# Patient Record
Sex: FEMALE | Race: WHITE | Employment: FULL TIME | URBAN - METROPOLITAN AREA
[De-identification: names, ages, dates, MRNs, and addresses within clinical notes are randomized per-mention and may not be internally consistent; named-entity substitution may affect disease eponyms.]

---
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
## 2019-01-16 NOTE — PROGRESS NOTES
Breast Surgery New Patient Consultation    This is the first visit for this 22year old woman, referred by Dr. Chuy Quiñonez, who presents for evaluation of bilateral breast lumps, breast pain and family history of breast cancer.     History of Present Illness: Comment:Shut down central nervous system.     Family History:   Family History   Problem Relation Age of Onset   • Cancer Mother 43        Breast cancer DCIS   • Thyroid Disorder Mother 52        Hashimoto's   • Diabetes Paternal Grandfather         Type 2 nausea, change in bowel habits, diarrhea, abdominal pain or vomiting blood.      Genitourinary:  The patient denies frequent urination, needing to get up at night to urinate, urinary hesitancy or retaining urine, painful urination, urinary incontinence, dec expands symmetrically. The lungs are clear to auscultation. Heart: The rhythm is regular. There are no murmurs, rubs, gallops or thrills. Breasts:  Her breasts are symmetrical with a cup size 34D.   Right breast: The skin, nipple ,and areola appear n demonstrated dense fibroglandular tissue that was heterogeneous in appearance.   She did not have significant fibrocystic disease in the upper outer quadrant primarily in the small hypoechoic nodule consistent with a complex cyst in the left small fibroaden

## 2019-02-04 NOTE — PROGRESS NOTES
Ela De Leon is a 22year old female. Patient presents with:  Acne: New patient present today with red raised acne like lesions on face and back. Patient was referred by PCP (Dr. Gabriela Garcia) Patient currently not using anything to treat acne.  Patient d file      Number of children: Not on file      Years of education: Not on file      Highest education level: Not on file    Social Needs      Financial resource strain: Not on file      Food insecurity - worry: Not on file      Food insecurity - inability: and back. Patient was referred by PCP (Dr. Muna Santo) Patient currently not using anything to treat acne. Patient denies any changes in soap or detergent     Patient complains of very sensitive skin and has difficulty using over-the-counter topicals.   Chest hydrocortisone, alphahydroxy acids such as glycolic, lactic or salicylic acid to smooth lesions. Reassurance given    If stable RTC 3-4 months if not improving or p.r.n. No orders of the defined types were placed in this encounter.       Meds & Refills

## 2019-06-03 NOTE — PROGRESS NOTES
CHIEF COMPLAINT:   Patient presents with:  UTI        HPI:   Tyrel Augustine is a 20year old female presents with symptoms of UTI. Complaining of urinary frequency, urgency, dysuria with blood tinged urine since yesterday.   Symptoms have been mild since URINALYSIS, AUTO, W/O SCOPE    Collection Time: 06/03/19 11:12 AM   Result Value Ref Range    Glucose Urine neg mg/dL    Bilirubin neg Negative    Ketones, UA neg Negative mg/dL    Spec Gravity 1.030 1.005 - 1.030    Blood Urine large (A) Negative    PH Ur If a urine culture was sent out, we will contact you with the results in 48-72 hours via phone or [employer]. If positive, then we will call in an appropriate antibiotic or change antibiotic if needed.  If negative, then you should stop antibiotics as it is no -Schedule appt with PCP or gyne if symptoms persist after completion of antibiotics,  if negative urine culture, if there is possibility of STD, if vaginal/penile discharge or other symptoms.  -Would recommend urine culture if symptoms persist or return wi The most common cause of bladder infections is bacteria from the bowels. The bacteria get onto the skin around the opening of the urethra. From there, they can get into the urine and travel up to the bladder, causing inflammation and infection.  This usuall · Urinate more often. Don't try to hold urine in for a long time. · Wear loose-fitting clothes and cotton underwear. Avoid tight-fitting pants. · Improve your diet and prevent constipation.  Eat more fresh fruit and vegetables, and fiber, and less junk an

## 2019-06-03 NOTE — PATIENT INSTRUCTIONS
If a urine culture was sent out, we will contact you with the results in 48-72 hours via phone or Mamapediahart. If positive, then we will call in an appropriate antibiotic or change antibiotic if needed.  If negative, then you should stop antibiotics as it i -Schedule appt with PCP or gyne if symptoms persist after completion of antibiotics,  if negative urine culture, if there is possibility of STD, if vaginal/penile discharge or other symptoms.  -Would recommend urine culture if symptoms persist or return wi The most common cause of bladder infections is bacteria from the bowels. The bacteria get onto the skin around the opening of the urethra. From there, they can get into the urine and travel up to the bladder, causing inflammation and infection.  This usuall · Urinate more often. Don't try to hold urine in for a long time. · Wear loose-fitting clothes and cotton underwear. Avoid tight-fitting pants. · Improve your diet and prevent constipation.  Eat more fresh fruit and vegetables, and fiber, and less junk an

## 2019-06-16 NOTE — ED PROVIDER NOTES
Patient Seen in: [facility] AND [facility] Emergency Department    History   Patient presents with:  Abdomen/Flank Pain (GI/)    Stated Complaint: abdominal pain     SARA Luevano is a 20year old female who presents with chief complaint of generaliz use: No      Review of Systems    Positive for stated complaint: abdominal pain   Other systems are as noted in HPI. Constitutional and vital signs reviewed. All other systems reviewed and negative except as noted above.     PSFH elements reviewed fro no obvious deformity. Neurological: Gross motor movement is intact in all 4 extremities. Patient exhibits normal speech. Skin: Skin is normal to inspection. Warm and dry. No obvious bruising. No obvious rash.            ED Course     Labs Reviewed   U follow-up discussed with patient. Patient called following stool panel results. Results reviewed with patient. Patient case discussed with and patient seen by Dr. Lena Barrett. Patient case discussed with Dr. cleaning.     Patient case discussed with Dr. Zara Gilford

## 2019-06-16 NOTE — ED INITIAL ASSESSMENT (HPI)
On June 3rd patient dx with uti, complied a 5 day course of macrobid  Reports abdominal pain since this past Tuesday accompanied by loose stools. State she noted blood in her stools this morning.

## 2019-06-16 NOTE — ED PROVIDER NOTES
Patient presents with:  Abdomen/Flank Pain (GI/)      HPI:     Bill Eaton is a 20year old female who presents with a chief complaint of acute abdominal pain that she woke up with this morning.   The patient states on Anny 3 she was treated for a urinary tra file      Food insecurity:        Worry: Not on file        Inability: Not on file      Transportation needs:        Medical: Not on file        Non-medical: Not on file    Tobacco Use      Smoking status: Never Smoker      Smokeless tobacco: Never Used above.  Constitutional and Vital Signs Reviewed.       Physical Exam:     /82   Pulse 70   Temp 98.6 °F (37 °C) (Oral)   Resp 20   Ht 165.1 cm (5' 5\")   Wt 69.9 kg   LMP 06/15/2019   SpO2 100%   BMI 25.63 kg/m²   GENERAL: well developed, well nourish

## 2019-06-17 NOTE — PROGRESS NOTES
Patient ID: Zach Sandoval is a 20year old female. Patient presents with:  Hospital F/U: from [facility] ER on 6/16/19 for Acute colitis, no meds were given, persitant abdominal pain        HISTORY OF PRESENT ILLNESS:   HPI  Patient presents for above.   Her Tab, Take 1 tablet (50 mg total) by mouth every 6 (six) hours as needed for Pain., Disp: 30 tablet, Rfl: 1    Allergies:  Brevital Sodium [Me*    PALPITATIONS, OTHER (SEE COMMENTS)    Comment:Shut down central nervous system.     Social History    Socioecon Yes          biking, swimming, running and softball        Bike Helmet: Not Asked        Seat Belt: Not Asked        Self-Exams: Not Asked        Grew up on a farm: Not Asked        History of tanning: Not Asked        Outdoor occupation: Not Asked PositiveAbnormal     Shig/Enteroinvasive E.  Coli Pcr Negative Negative    Cryptosporidium Pcr Negative Negative    Cyclospora Cayetanensis Pcr Negative Negative    Entamoeba Histolytica Pcr Negative Negative    Giardia Lamblia Pcr Negative Negative    [place]

## 2019-06-17 NOTE — PATIENT INSTRUCTIONS
Discharge Instructions: Eating a Soft Diet  You have been prescribed a soft diet (also called gastrointestinal soft diet or bland diet). This reduces the amount of work your digestive tract has to do.  It also reduces the chance that your digestive tract · Tofu  Foods to avoid  · Nuts and seeds  · Snack foods, such as the following:  ? Chocolate-containing snacks, candy, pastries, or cakes. ? Potato chips (plain, barbecued, or other flavors)  ? Taco chips or nachos  ? Corn chips  ?  Popcorn, popcorn cakes, Your healthcare provider may recommend a bland diet if you have an upset stomach. It consists of foods that are mild and easy to digest. It is better to eat small frequent meals rather than 3 large meals a day.     Beverages  OK: Fruit juices, non-caffeinat © 8222-3245 [employer] [address]. [address] [facility], [facility] [place]. All rights reserved. This information is not intended as a substitute for professional medical care. Always follow your healthcare professional's instructions.

## 2019-06-17 NOTE — TELEPHONE ENCOUNTER
Per pt was seen for blood in the stool. Pt states the color has changed from bright red to dark red. Pt states she start taking the tramadol today and was only change.  Please advise

## 2019-06-17 NOTE — TELEPHONE ENCOUNTER
Was in ER. 6/16/19 for abd pain. Stool panel was completed, and pos for Shig Txn and E. Coli. Patient continues with abd cramping sharp pain. 9/10. Constant pain. Has bloody diarrhea for past 7 days. appt with GI, Next Monday 6/24/19.   Jayce de guzman

## 2019-06-17 NOTE — TELEPHONE ENCOUNTER
That just means the blood is getting old and broken down. It will change color to dark when that happens. This is not because of the tramadol.   If she starts having fevers or or worsening abdominal pain however she should go to the emergency room right a

## 2019-06-18 NOTE — TELEPHONE ENCOUNTER
Patient calling to follow up per below. States that her pain has been worsening today and requests new pain medication. Please advise.

## 2019-06-18 NOTE — TELEPHONE ENCOUNTER
Advised patient of Dr. Benitez Needs note. Patient verbalized understanding. Encouraged patient to go to ED now. Patient reluctant to go to ED just yet and asking if she could try doubling up on the tramadol dosage?

## 2019-06-18 NOTE — TELEPHONE ENCOUNTER
Hi Dr. Andrea Fields,    Patient is requesting a referral for more chiropractic visits with Dr. Yanira Woods, please sign referral if you agree.      Thank you,   Juan F Cleveland

## 2019-06-18 NOTE — TELEPHONE ENCOUNTER
Action Requested: Summary for Provider     []  Critical Lab, Recommendations Needed  [x] Need Additional Advice  []   FYI    []   Need Orders  [x] Need Medications Sent to Pharmacy  []  Other     SUMMARY: Dr Perla Harris, patient called back to report that she co

## 2019-06-18 NOTE — TELEPHONE ENCOUNTER
Spoke with Dr. Gagan Le regarding question below and he stated that he wouldn't recommend it. \" I'm worried she may have a perforation if the pain is that bad. \" Contacted patient and advised.  Patient stating she will consider going to ED and will discuss wi

## 2019-06-18 NOTE — TELEPHONE ENCOUNTER
Unfortunately the only stronger pain medication is Norco and I prefer not to give that to her given her condition.   The best advice I can give her right now if the pain is so bad is to go to the emergency room for some IV medication that will work better a

## 2019-06-19 NOTE — TELEPHONE ENCOUNTER
SHANTI Villavicencio pt stated that she did not go to ER. She stated that she waited and today the pain is a 7/10. She will continue with the medication she has and the icing. She will monitor her s/sx for now.

## 2019-06-24 NOTE — H&P
[facility] - Gastroenterology  Clinic History and Physical     Patient presents with:  Hospital F/U: Colitis      HPI:   Tracy Osgood is a 20year old year-old female with no significant PMHx following up from ER visit for hemorrhagic diarrhea. Relation Age of Onset   • Cancer Mother 43        Breast cancer DCIS   • Thyroid Disorder Mother 52        Hashimoto's   • Diabetes Paternal Grandfather         Type 2   • Diabetes Other         PGGM   • Heart Disease Maternal Grandfather          @ 46 moist  CV: regular rate and rhythm, the extremities are warm and well perfused   Lung: Moves air well;  No labored breathing  Abdomen: soft, non-tender exam in all quadrants without rigidity or guarding, non-distended, no abnormal bowel sounds noted, no mas care  Discussed transmission and discussed how to avoid transmission:   From [facility] website  [facility] thoroughly after using the bathroom or changing diapers and before preparing or eating food.  [facility] YOUR HANDS after contact with animals or their env
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
## 2019-06-24 NOTE — PATIENT INSTRUCTIONS
Improving infectious colitis-- shiga toxin/Ecoli positive  Return to clinic or ER if more pain, diarrhea or bloody stools  Return to clinic or ER if fevers

## 2019-07-15 PROBLEM — G25.89 SCAPULAR DYSKINESIS: Status: ACTIVE | Noted: 2019-07-15

## 2019-07-15 PROBLEM — M47.816 FACET SYNDROME, LUMBAR: Status: ACTIVE | Noted: 2019-07-15

## 2019-07-15 PROBLEM — M54.50 CHRONIC MIDLINE LOW BACK PAIN WITHOUT SCIATICA: Status: ACTIVE | Noted: 2019-07-15

## 2019-07-15 PROBLEM — G89.29 CHRONIC MIDLINE LOW BACK PAIN WITHOUT SCIATICA: Status: ACTIVE | Noted: 2019-07-15

## 2019-07-15 PROBLEM — M79.18 MYOFASCIAL PAIN: Status: ACTIVE | Noted: 2019-07-15

## 2019-07-15 PROBLEM — M47.894 THORACIC FACET JOINT SYNDROME: Status: ACTIVE | Noted: 2019-07-15
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
## 2019-07-15 NOTE — PATIENT INSTRUCTIONS
1) Get XR of the thoracic and lumbar spine on your way out today  2) Start flexeril 5 mg three times per day as needed for spasms  3) Start Cymbalta 30 mg daily. This medication may take some time to ramp up.   4) My office will call you once the MRI is toy

## 2019-07-27 NOTE — PATIENT INSTRUCTIONS
-Take antibiotics with food and plenty of water. Eat yogurt or take probiotic daily.   Align is a good otc probiotic.  -Use flonase-- 2 sprays each nostril once daily  To make sure you're using it properly-- look at the floor, insert the nozzle into the n help treat your sinusitis. If you have an infection, antibiotics can help clear it up. If you are prescribed antibiotics, take all pills on schedule until they are gone, even if you feel better. Decongestants help relieve swelling.  Use decongestant sprays

## 2019-07-27 NOTE — PROGRESS NOTES
CHIEF COMPLAINT:   Patient presents with:  Sinus Problem: sinus pressue, congestion, sore throat, ear discomfort  x 1 wk       HPI:   Evin Ocampo is a 20year old female who presents for sinus congestion for  1  weeks.  Symptoms have been worsening Performed by Ravi Moody MD at [facility]      Family History   Problem Relation Age of Onset   • Cancer Mother 43        Breast cancer DCIS   • Thyroid Disorder Mother 52        Hashimoto's   • Diabetes Paternal Grandfather         Type No orders of the defined types were placed in this encounter. Advised patient of plan as discussed in patient instructions.        Meds & Refills for this Visit:  Requested Prescriptions     Signed Prescriptions Disp Refills   • Amoxicillin-Benjamin Miller Drinking extra fluids helps thin your mucus. This lets it drain from your sinuses more easily. Have a glass of water every hour or two. A humidifier helps in much the same way. Fluids can also offset the drying effects of certain medicines.  If you use a hu

## 2019-07-30 NOTE — TELEPHONE ENCOUNTER
Patient contacted [facility] after being seen on 7/27 for sinusitis. Patient given Augmentin and Tessalon. Per note, patient not feeling better. Attempted to reach patient, but call went to  and mailbox was full.  Unable to leave message

## 2019-07-31 NOTE — TELEPHONE ENCOUNTER
Patient contacted call center again and when I returned phone call she did not answer.  Unable to leave VM

## 2019-07-31 NOTE — TELEPHONE ENCOUNTER
Contacted patient again. Sinus sx are improving. She has now developed blisters in back of throat-- + painful, sore throat. No fever. No other rash at this time. Discussed possible early HFM.   Patient states had HFM last summer and thought that could b

## 2019-08-01 NOTE — PROGRESS NOTES
[address]  Progress Note    CHIEF COMPLAINT:  Patient presents with:  Low Back Pain: LOV 07/15/19 Pt states that shes present to get her MRI results from the doctor.  Pt states that the pain is still the lauryn Breast cancer DCIS   • Thyroid Disorder Mother 52        Hashimoto's   • Diabetes Paternal Grandfather         Type 2   • Diabetes Other         PGGM   • Heart Disease Maternal Grandfather          @ 46 yrs   • Cancer Maternal Grandmother 64        Randi kg/m². General: No immediate distress  Head: Normocephalic/ Atraumatic  Eyes: Extra-occular movements intact. Ears: No auricular hematoma or deformities  Mouth: No lesions or ulcerations  Heart: peripheral pulses intact. Normal capillary refill.    L Urine 06/16/2019 Negative  Negative mg/dL Final   • Bilirubin Urine 06/16/2019 Negative  Negative Final   • Blood Urine 06/16/2019 Small* Negative Final   • Nitrite Urine 06/16/2019 Negative  Negative Final   • Urobilinogen Urine 06/16/2019 <2.0  <2.0 Miner Ai 30.3  26.0 - 34.0 pg Final   • MCHC 06/16/2019 33.8  31.0 - 37.0 g/dL Final   • RDW-SD 06/16/2019 40.2  35.1 - 46.3 fL Final   • RDW 06/16/2019 12.3  11.0 - 15.0 % Final   • PLT 06/16/2019 288.0  150.0 - 450.0 10(3)uL Final   • Neutrophil Absolute Prelim 0 Histolytica Pcr 06/16/2019 Negative  Negative Final   • Giardia Lamblia Pcr 06/16/2019 Negative  Negative Final   • Adenovirus F 40/41 Pcr 06/16/2019 Negative  Negative Final   • Astrovirus Pcr 06/16/2019 Negative  Negative Final   • Norovirus Gi/Gii Pcr 0 osseous lesion is evident. CORD: Normal caliber, contour, and signal intensity. DISCS: Minimal disc desiccation and degeneration at T6-T7. No evidence of significant neural compromise at this level or elsewhere throughout the thoracic spine.   PARASP facet joint syndrome ([facility]    Elham Calvin.  Caleb Bach MD  Physical Medicine and Rehabilitation/Sports Medicine  MEDICAL CENTER [facility]

## 2019-08-06 NOTE — PROGRESS NOTES
Patient ID: Elvi Reyna is a 20year old female. Patient presents with:  Cough: cough attacks, Sore throat, upper back pain, and chest pain from coughing. HISTORY OF PRESENT ILLNESS:   HPI  Patient resents for above.   Here with a cough for Muscle spasms. , Disp: 90 tablet, Rfl: 0  •  Multiple Vitamins-Minerals (WOMENS MULTI VITAMIN & MINERAL) Oral Tab, Take by mouth., Disp: , Rfl:   •  traMADol HCl 50 MG Oral Tab, Take 1 tablet (50 mg total) by mouth every 6 (six) hours as needed for Pain., D Sleep Concern: Not Asked        Stress Concern: Not Asked        Weight Concern: Not Asked        Special Diet: Not Asked        Back Care: Not Asked        Exercise: Yes          biking, swimming, running and softball        Bike Helmet: Not Asked Refill: 0  · Continue symptomatic treatment. 2. Bronchitis  · predniSONE 20 MG Oral Tab; Take 2 tablets (40 mg total) by mouth daily for 5 days. Dispense: 10 tablet; Refill: 0  · guaiFENesin-codeine (CHERATUSSIN AC) 100-10 MG/5ML Oral Solution;  Take 5

## 2019-08-07 NOTE — TELEPHONE ENCOUNTER
Patient seen in [address] [place] [address] Rd 8/6/19 with Dr Jacinda Cr for symptoms of cough, sore throat, upper back and chest pain due to cough. Started on prednisone and cheratussin, also using Ibuprofen for pain.  Reports yesterday evening and throughout today pain in upper back is much

## 2019-08-07 NOTE — TELEPHONE ENCOUNTER
What she is experiencing is most likely costochondritis. Can happen with prolonged bouts of coughing. I can prescribe her tramadol. Please call in tramadol 50 mg every 6 hours as needed #30, 1 refill.

## 2019-08-07 NOTE — TELEPHONE ENCOUNTER
Rx pending for approval. Please sign. Informed pt Dr. Hurtado Child instructions. Pt verbalized understanding. Rx phoned in.

## 2019-08-12 NOTE — TELEPHONE ENCOUNTER
LMTCB; attempted to reach pt regarding this MyChart message sent yesterday:    Evin Elizondo MD 18 hours ago (6:37 PM)         Hi Dr. Serrano Her,   I’m reaching back out because after this week my symptoms have not been getting much better,

## 2019-08-12 NOTE — TELEPHONE ENCOUNTER
Please confirm with patient that she really wants to get a CT scan done. There is a large amounts of radiation that she will be exposed to for something that most likely will resolve in 7 to 10 days. Please pend the cough medication as well.

## 2019-08-12 NOTE — TELEPHONE ENCOUNTER
Pt called requesting if CT can be ordered without her going to the ED. Pt states she has a big copayment if she go to the ED. Also requesting if refill on cough medication. Please advise.

## 2019-08-12 NOTE — TELEPHONE ENCOUNTER
Pt returned call, states symptoms have not improved. Pt states she continues to have upper back pain, rib pain, into chest area. Pt states she continues to have a cough, cough productive of yellowish sputum.  Pt states she had some shortness of breath on Sa

## 2019-08-12 NOTE — TELEPHONE ENCOUNTER
Unable to leave VM on mobile number as VM is full. Left VM on home number informing patient to call us back. Patient was sent a "[employer] message to call our office.

## 2019-08-12 NOTE — TELEPHONE ENCOUNTER
Reviewed doctor's recommendations with pt. Pt states it is hard to get up and move around due to the pain. Pt states the cough med is not helping as much as she would like, she also has to take Nyquil so she can sleep at night.  Pt is asking if there is a s

## 2019-08-12 NOTE — TELEPHONE ENCOUNTER
From: Jaspal Contreras  To: Twin Hernandez MD  Sent: 8/11/2019 6:37 PM CDT  Subject: Visit Follow-up Question    Hi Dr. Meggan Niño,  I’m reaching back out because after this week my symptoms have not been getting much better, I finished the steroids, and I stil

## 2019-08-12 NOTE — TELEPHONE ENCOUNTER
Given her symptoms I would advise that she goes to the emergency room. She may need a CT of her chest for better evaluation if she continues to have the symptoms.

## 2019-08-13 NOTE — TELEPHONE ENCOUNTER
Pt is returning call and states she still have guaifenesin-codeine and Tramadol. She states\" both medications are not working . \"   Dr Stevie Wright ,pt wants know if there's other medication you can recommend or prescribe. Please disregard request for refill for

## 2019-08-13 NOTE — TELEPHONE ENCOUNTER
Spoke with patient ( verified) and relayed AG message below--patient verbalizes understanding and agreement. Patient states she is unable to schedule appt for today, \"But I will keep doing what I'm doing and hopefully I will get better.  If not, I w

## 2019-08-13 NOTE — TELEPHONE ENCOUNTER
Those are essentially the strongest I can give for her condition. If she would like she can come back in for reevaluation. She also has the option of going to the emergency room.

## 2019-08-13 NOTE — TELEPHONE ENCOUNTER
Attempted to call patient. Her cell VM is full.  LMTCB on home number - transfer to triage    Dr. Dacia Goldman, medication pended, please advise

## 2019-08-14 NOTE — PROGRESS NOTES
Patient ID: Jesus Crump is a 20year old female. Patient presents with: Follow - Up: Per patient was seen 1.5 week ago, dx with bronchitis. Per pain symptoms are getting worse. Sweats, nausea, cough, and per mother lips become white at times. guaiFENesin-codeine (CHERATUSSIN AC) 100-10 MG/5ML Oral Solution, Take 5 mL by mouth every 6 (six) hours as needed for cough. , Disp: 118 mL, Rfl: 0  •  traMADol HCl 50 MG Oral Tab, Take 1 tablet (50 mg total) by mouth every 6 (six) hours as needed for Pain violence:        Fear of current or ex partner: Not on file        Emotionally abused: Not on file        Physically abused: Not on file        Forced sexual activity: Not on file    Other Topics      Concerns:         Service: Not Asked        Blo 1 tablet (10 mg total) by mouth daily. PREDNISONE TAPER:  · Take 40mg for 3 days, then  · Take 30mg for 3 days, then  · Take 20mg for 3 days, then  · Take 10mg for 3 days, then  · STOP! Dispense: 30 tablet;  Refill: 0  · guaiFENesin-codeine (Donnel Imam

## 2019-08-16 NOTE — TELEPHONE ENCOUNTER
Patient called stating her pharmacy, [facility] in [place], stated they had not received patient's Prednisone prescription and cough syrup. Per chart review, scripts were sent into [facility] in [place] as fax and not called in.      Spoke to Adela at [facility]

## 2019-09-06 NOTE — PROGRESS NOTES
LUMBAR SPINE EVALUATION:   Referring Physician: Dr. Aziza Hancock  Diagnosis: Chronic bilateral low back pain without sciatica (M54.5,G89.29)  Mid back pain (M54.9)  Scapular dyskinesis (G25.89)  Myofascial pain (M79.18)  Thoracic facet joint syndrome ([address] (M Ligament repair right thumb   • OTHER SURGICAL HISTORY  4/27/17    [place] teeth removal (all four)   • ULNAR COLLATERAL LIGAMENT REPAIR Right 4/27/2015    Performed by Wendelyn Osler, MD at [facility]     Imaging:   See medical record for mo Lordosis: increased  Lateral Shift: None noted  Correction of Posture: Better    Gait Deviations: None noted     AROM Major Limitation: 75+% Limited Moderate Limitation: 50% Limited Min Limitation: 25% Limited No Limitation Comments   Flexion    X    Exten Based on clinical rationale and outcome measures, this evaluation involved Moderate Complexity decision making due to 3+ personal factors/comorbidities, 4+ body structures involved/activity limitations, and evolving symptoms including changing pain levels.

## 2019-09-12 NOTE — PROGRESS NOTES
Dx: Chronic bilateral low back pain without sciatica (M54.5,G89.29)  Mid back pain (M54.9)  Scapular dyskinesis (G25.89)  Myofascial pain (M79.18)  Thoracic facet joint syndrome (HCC) (M46.94)  Facet syndrome, lumbar (M47.816)          Insurance (Authorize forearm wall slides, posture with lumbar support, seated thoracic ext    Charges:  TherEx3       Total Timed Treatment: 45 min  Total Treatment Time: 46 min

## 2019-09-25 NOTE — PROGRESS NOTES
Dx: Chronic bilateral low back pain without sciatica (M54.5,G89.29)  Mid back pain (M54.9)  Scapular dyskinesis (G25.89)  Myofascial pain (M79.18)  Thoracic facet joint syndrome (HCC) (M46.94)  Facet syndrome, lumbar (M47.816)          Insurance (Authorize 15  Prone TA brace with alt hip ext x 10  S/L clam TA brace 5\" x 10 r/l  KT tape for thoracic postural muscles in X formation with wearing instructions and precautions  Pressure biofeedback 40mm Hg TA brace with:   BKFO x 10   Alt hip flex 2 x 10

## 2019-10-03 NOTE — PROGRESS NOTES
Dx: Chronic bilateral low back pain without sciatica (M54.5,G89.29)  Mid back pain (M54.9)  Scapular dyskinesis (G25.89)  Myofascial pain (M79.18)  Thoracic facet joint syndrome (HCC) (M46.94)  Facet syndrome, lumbar (M47.816)          Insurance (Authorize TA brace with BKFO x 10   TA brace with alt hip flexion x 10   Prone TA brace with hip ext x 10   -Seated thoracic ext over ball 2 x 10  Prone W x 10  Prone T x 10   -Forearm wall slides with scap depression x 10  TE:  -Prone W x 10  Prone Y x 10  Prone T

## 2019-10-07 NOTE — PROGRESS NOTES
Dx: Chronic bilateral low back pain without sciatica (M54.5,G89.29)  Mid back pain (M54.9)  Scapular dyskinesis (G25.89)  Myofascial pain (M79.18)  Thoracic facet joint syndrome (HCC) (M46.94)  Facet syndrome, lumbar (M47.816)          Insurance (Authorize clinic today with posture brace/reminder tool. This was fit to the patient and she was instructed to use as needed as a postural cue and not to rely on the device.  Pt with severe limitation in lat length, lat str added to protocol today (added to HEP as we

## 2019-10-10 NOTE — PROGRESS NOTES
Dx: Chronic bilateral low back pain without sciatica (M54.5,G89.29)  Mid back pain (M54.9)  Scapular dyskinesis (G25.89)  Myofascial pain (M79.18)  Thoracic facet joint syndrome (HCC) (M46.94)  Facet syndrome, lumbar (M47.816)          Insurance (Authorize P/A Tspine, Lspine  2. Pec minor stm release Manual: 15 mins  1. Pec minor stm release  2.R rib 3-10 inferior glide  Manual  1. Pec minor stm release 10 mins  2. Scap mobilization and mid trap/ rhomboids STM 10 mins  3.  SC joint mobilization inferior and p

## 2019-10-14 NOTE — PROGRESS NOTES
LUMBAR SPINE RE-ASSESSMENT:   Referring Physician: Dr. Ortega Spine  Diagnosis: Chronic bilateral low back pain without sciatica (M54.5,G89.29)  Mid back pain (M54.9)  Scapular dyskinesis (G25.89)  Myofascial pain (M79.18)  Thoracic facet joint syndrome ([address] work for 30-45 mins (current: 1+ hours, pt reports decreased pain with better posture), walking longer than 15 mins (current: 30-45 mins), sitting for longer 30-45 mins (current: 1+ hour)  Disturbed Sleep: Since Startin2019 1-2x a night (resolved) winging and greatly improved sitting/standing posture with decreased forward head/rounded shoulders posture. Mert Fuentes is also demonstrating improved R sided rib mobility due to hyperinflation on he R side due to her scoliotic presentation.  This i hypermobile  R ribs 3-10 hypomobility and restriction with elevation/depression. - Greatly improving with current interventions.      Today’s Treatment and Response:   Pt education was provided on exam findings, treatment diagnosis, treatment plan, expectat you have any questions, please contact me at Dept: [phone]    Sincerely,  Electronically signed by therapist: Malini Alvarez, PT, DPT, CSCS      [de-identified] certification required: Yes  I certify the need for these services furnishe

## 2019-10-14 NOTE — PROGRESS NOTES
Dx: Chronic bilateral low back pain without sciatica (M54.5,G89.29)  Mid back pain (M54.9)  Scapular dyskinesis (G25.89)  Myofascial pain (M79.18)  Thoracic facet joint syndrome (HCC) (M46.94)  Facet syndrome, lumbar (M47.816)          Insurance (Authorize Lspine  2. Pec minor stm release Manual: 15 mins  1. Pec minor stm release   Manual  1. Pec minor stm release 10 mins  2. Scap mobilization and mid trap/ rhomboids STM 10 mins  3. SC joint mobilization inferior and posterior 10 mins   Manual  1.  Pec minor
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
## 2019-10-16 NOTE — PATIENT INSTRUCTIONS
1) Continue working with [employer] in PT, mostly focusing on the lumbar spine and continue your home exercise for the upper back  2) OK to take Tylenol as needed for pain.   3) Continue icing the low back and upper back  4) Follow up with me in 2-3 months

## 2019-10-17 NOTE — PROGRESS NOTES
Dx: Chronic bilateral low back pain without sciatica (M54.5,G89.29)  Mid back pain (M54.9)  Scapular dyskinesis (G25.89)  Myofascial pain (M79.18)  Thoracic facet joint syndrome (HCC) (M46.94)  Facet syndrome, lumbar (M47.816)          Insurance (Authorize pec str 2 mins (10 second holds) See re-assessment 25 mins Jerrica:   1. Repeated ext in lying 2x20  Lat str at pole 10x10 sec  Lat str on wall [id number] sec  Star drill on wall 2x10 (B)  TD on wall 2x10  Wall romi 2x20  Theraband:  1.  Row 2x20 [place] [id number] attainment       Plan: Bryan Juan will continue to have PT focus on improving posture and improving periscapular activation and positioning     Charges:  TherEx 1, man mob2      Total Timed Treatment:45 min  Total Treatment Time: 47 min

## 2019-10-21 NOTE — PROGRESS NOTES
Dx: Chronic bilateral low back pain without sciatica (M54.5,G89.29)  Mid back pain (M54.9)  Scapular dyskinesis (G25.89)  Myofascial pain (M79.18)  Thoracic facet joint syndrome (HCC) (M46.94)  Facet syndrome, lumbar (M47.816)          Insurance (Authorize Manual  1. Pec minor stm release 5 mins  2. R rib 3-10 inferior glide 5 mins  3. T10-L3 P/A mobilization Grade 3/4 10 mins  4. Clavicle posterior mobilization grade 3 at [facility] joint 5 mins Manual  1.  Pec minor stm release 5 mins  2. R rib 3-10 inferior glide

## 2019-10-31 NOTE — PROGRESS NOTES
Dx: Chronic bilateral low back pain without sciatica (M54.5,G89.29)  Mid back pain (M54.9)  Scapular dyskinesis (G25.89)  Myofascial pain (M79.18)  Thoracic facet joint syndrome (HCC) (M46.94)  Facet syndrome, lumbar (M47.816)          Insurance (Authorize release 5 mins  2. Scap mobilization and mid trap/ rhomboids STM 5 mins  3. SC joint mobilization inferior and posterior, distal clavicle posterior glide  5mins  4. R rib 3-10 inferior glide 5 mins Manual  1.  Pec minor stm release 5 mins  2. R rib 3-10 inf performance and household ADL performance. - Making progress towards goal attainment       Plan: Will continue to progress with emphasis on improving rib mobility and spinal endurance/stability. Charges:  TherEx 1, man mob 2      Total Timed Treatment:

## 2019-11-04 NOTE — PROGRESS NOTES
Dx: Chronic bilateral low back pain without sciatica (M54.5,G89.29)  Mid back pain (M54.9)  Scapular dyskinesis (G25.89)  Myofascial pain (M79.18)  Thoracic facet joint syndrome (HCC) (M46.94)  Facet syndrome, lumbar (M47.816)          Insurance (Anahi Gonzalez rhomboids STM 5 mins  3. SC joint mobilization inferior and posterior, distal clavicle posterior glide  5mins  4. R rib 3-10 inferior glide 5 mins Manual  1. Pec minor stm release 5 mins  2. R rib 3-10 inferior glide 5 mins  3.  T10-L3 P/A mobilization [address] Oswaldo Player will report she is able to stand at work for 3 hours or longer with 1/10 pain or less to improve work performance - Making progress towards goal attainment3.  Oswaldo Player will report 1/10 pain or less with reaching into low cabinet

## 2019-11-07 NOTE — PROGRESS NOTES
Dx: Chronic bilateral low back pain without sciatica (M54.5,G89.29)  Mid back pain (M54.9)  Scapular dyskinesis (G25.89)  Myofascial pain (M79.18)  Thoracic facet joint syndrome (HCC) (M46.94)  Facet syndrome, lumbar (M47.816)          Insurance ([facility] 20 Manual  1. Pec minor stm release 5 mins  2. A R rib 3-10 inferior glide  with diaphram scoop and exhalation assist   B. L sidelying 2x10 increased inspiration patterning with manual facilitation   A/B 10 mins  3. T10-L3 P/A mobilization Grade 3/4 7 mins  4. and household ADL performance. - Making progress towards goal attainment       Plan: Will continue to progress with emphasis on improving rib mobility and spinal endurance/stability. Charges:  TherEx 2, man mob 1   Total Timed Treatment:45 min  Total T

## 2019-11-11 NOTE — PROGRESS NOTES
Dx: Chronic bilateral low back pain without sciatica (M54.5,G89.29)  Mid back pain (M54.9)  Scapular dyskinesis (G25.89)  Myofascial pain (M79.18)  Thoracic facet joint syndrome (HCC) (M46.94)  Facet syndrome, lumbar (M47.816)          Insurance (Eddie Llanos mobilization grade 3 at [facility] joint 5 mins Manual  1. Pec minor stm release 5 mins  2. R rib 3-10 inferior glide  with diaphram scoop and exhalation assist 8 mins  3. T10-L3 P/A mobilization Grade 3/4 7 mins  4.  Clavicle posterior mobilization grade 3 at [facility] j demonstrate improvement in bending/stooping performance and household ADL performance. - Making progress towards goal attainment       Plan: Will continue to progress with emphasis on motor control and T and L spine stability. Charges:  TherEx 3 Total Ti

## 2019-11-14 NOTE — PROGRESS NOTES
Dx: Chronic bilateral low back pain without sciatica (M54.5,G89.29)  Mid back pain (M54.9)  Scapular dyskinesis (G25.89)  Myofascial pain (M79.18)  Thoracic facet joint syndrome (HCC) (M46.94)  Facet syndrome, lumbar (M47.816)          Insurance (Jani Poag with heel lift 2x20  Cat/Camel 2x20  Quadruped Thoracic Rotation 2x20 x 5 sec  Close chain SA Rock 2x20   Manual  1. Pec minor stm release 5 mins  2. R rib 3-10 inferior glide 5 mins  3. T10-L3 P/A mobilization Grade 3/4 10 mins  4.  Clavicle posterior mobi progress. Pt is progressing well towards transition to home program over the next 2 visits. Goals:   1. Lian Bird will demonstrate score of 67/100 on FOTO to demonstrate return to maximum functional performance.  - Making progress towards goal a

## 2019-12-05 NOTE — PROGRESS NOTES
Dx: Chronic bilateral low back pain without sciatica (M54.5,G89.29)  Mid back pain (M54.9)  Scapular dyskinesis (G25.89)  Myofascial pain (M79.18)  Thoracic facet joint syndrome (HCC) (M46.94)  Facet syndrome, lumbar (M47.816)          Insurance (Authorize mins  2. A R rib 3-10 inferior glide  with diaphram scoop and exhalation assist   B. L sidelying 2x10 increased inspiration patterning with manual facilitation   A/B 10 mins  3. T10-L3 P/A mobilization Grade 3/4 7 mins  4.  Clavicle posterior mobilization gr 1/10 pain or less with reaching into low cabinets within her home to demonstrate improvement in bending/stooping performance and household ADL performance.  - Making progress towards goal attainment       Plan: Isela Garcia will continue to be progres

## 2019-12-12 NOTE — PROGRESS NOTES
LUMBAR SPINE RE-ASSESSMENT:   Referring Physician: Dr. Jonathan Prajapati  Diagnosis: Chronic bilateral low back pain without sciatica (M54.5,G89.29)  Mid back pain (M54.9)  Scapular dyskinesis (G25.89)  Myofascial pain (M79.18)  Thoracic facet joint syndrome ([address] Worst with: Bending forward (resolved), lifting any weight (current: limited to approx 30lbs), standing at work for 30-45 mins (current: 2 hours on most days, pt reports decreased pain with better posture), walking longer than 15 mins (current: nearly reso Paulie Mcgowan is a 20year old female who presents to physical therapy with the diagnosis of Chronic bilateral low back pain without sciatica (M54.5,G89.29)  Mid back pain (M54.9)  Scapular dyskinesis (G25.89)  Myofascial pain (M79.18)  Thoracic facet L Side Glide  X (initial)  X (current)            Thoracic:   FLX WNL  EXT 50% limited (Current 25% limited)  R/L Rotation WNL    Myotomes Left Right   Hip Flexion (L1-2) 5/5 5/5   Quads (L3) 5/5 5/5   Ant Tib (L4) 5/5 5/5   Great Toe Ext (L5) 5/5 5/5   PF Education or treatment limitation: None  Rehab Potential:good    FOTO:  Initial: 50/100  10/14/2019   Oswestry  Initial:33%  10/14/2019 8%    Patient/Family/Caregiver was advised of these findings, precautions, and treatment options and has agreed to [employer]
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
## 2020-01-16 PROBLEM — M54.2 TRIGGER POINT OF NECK: Status: ACTIVE | Noted: 2020-01-16

## 2020-01-16 PROBLEM — M25.512 PAIN IN LEFT ACROMIOCLAVICULAR JOINT: Status: ACTIVE | Noted: 2020-01-16

## 2020-01-16 PROBLEM — M25.512 PAIN OF LEFT STERNOCLAVICULAR JOINT: Status: ACTIVE | Noted: 2020-01-16

## 2020-01-16 NOTE — PROGRESS NOTES
[address]  Progress Note    CHIEF COMPLAINT:  Patient presents with:  Low Back Pain: LOV 10/15/19 Pt states the back is better but now shes having some chest and shoulder pain on the left side,       Histor file      FAMILY HISTORY:   Family History   Problem Relation Age of Onset   • Cancer Mother 43        Breast cancer DCIS   • Thyroid Disorder Mother 52        Hashimoto's   • Diabetes Paternal Grandfather         Type 2   • Diabetes Other         PGGM   • spontaneous speech intact  Motor:    Musculoskeletal:    CERVICAL SPINE:  Inspection: no erythema, swelling, or obvious deformity  Palpation: Tender myofascial trigger points to the left trapezius, levator scapula, and bilateral cervical paraspinals.   Chip Riley Final   • Chloride 01/15/2020 107  98 - 112 mmol/L Final   • CO2 01/15/2020 29.0  21.0 - 32.0 mmol/L Final   • Anion Gap 01/15/2020 4  0 - 18 mmol/L Final   • BUN 01/15/2020 13  7 - 18 mg/dL Final   • Creatinine 01/15/2020 0.71  0.55 - 1.02 mg/dL Final   • Neutrophil Absolute 01/15/2020 2.05  1.50 - 7.70 x10(3) uL Final   • Lymphocyte Absolute 01/15/2020 1.57  1.00 - 4.00 x10(3) uL Final   • Monocyte Absolute 01/15/2020 0.30  0.10 - 1.00 x10(3) uL Final   • Eosinophil Absolute 01/15/2020 0.05  0.00 - 0.70 x1 continue with her home exercise program for the low back pain. If she does not have any improvement after the trigger point injections, then I will order a CT of the chest to evaluate the sternoclavicular joints.   We have discussed steroid injections vers

## 2020-01-16 NOTE — PATIENT INSTRUCTIONS
My office will call you to schedule the trigger point injections once the procedure is approved by your insurance carrier.       If no improvement, then we will order CT of the chest to evaluate the sternoclavicular joints    We can then discuss steroid inj

## 2020-01-17 NOTE — TELEPHONE ENCOUNTER
Trigger point injections cpt code 61172  Received fax from Jan Ortiz advising no authorization is required for in office injection/procedure.  Will inform Nursing

## 2020-02-10 NOTE — PROCEDURES
Procedure: Trigger point injections    Indication: left neck and upper back pain    Consent: Informed consent was obtained from patient.  Patient was explained the risks, benefits and alternatives of procedure, risks including but not limited to bleeding,

## 2020-02-19 NOTE — TELEPHONE ENCOUNTER
trigger point injections cpt codes , . Received fax from Laura advising authorization is not required for in office injection/procedure.  Will inform Nursing

## 2020-02-20 NOTE — TELEPHONE ENCOUNTER
Attepted to call pt so trigger point injections can be scheduled. Both home and cell mailbox full unable to leave message. Pt needs call back to schedule.

## 2020-02-21 NOTE — TELEPHONE ENCOUNTER
Spoke to patient, states did get relief from trigger point injections done 2/10/19. Rosalia Farooq is already scheduled for 3/9/20.

## 2020-03-09 PROBLEM — M47.816 LUMBAR SPONDYLOSIS: Status: ACTIVE | Noted: 2020-03-09

## 2020-03-09 PROBLEM — M79.10 MYALGIA: Status: ACTIVE | Noted: 2020-01-16

## 2020-03-09 NOTE — PATIENT INSTRUCTIONS
1) Mildred send me a mychart message and let me know how you are feeling and fi trigger point injection were beneficial  2) We can repeat in 1 month if necessary

## 2020-03-19 NOTE — PROGRESS NOTES
Jose Ramirez is a 20year old female  Patient's last menstrual period was 2020. here for annual exam.       New pt. Menses q month. Condoms. Wants STD screen. Turning 20 next week and wants pap done.       OBSTETRICS HISTORY:  OB His Denies fatigue, night sweats, hot flashes  Eyes:  denies blurred or double vision  Cardiovascular:  denies chest pain or palpitations  Respiratory:  denies shortness of breath  Gastrointestinal:  denies heartburn, abdominal pain, diarrhea or constipation examination  Papc done. ASCCP guidelines reviewed. Encouraged annual exam.   RTC 1 year or prn    2. Screen for STD (sexually transmitted disease)  Gc/chlamydia/trichomonas.         Requested Prescriptions      No prescriptions requested or ordered in th

## 2020-03-24 NOTE — PROGRESS NOTES
Pap-- ASCUS with negative HPV. Needs repeat pap/HPV in 3 years. Return to clinic 1 year for annual.    Negative gc/chlamydia/trichomonas.   mychart

## 2020-06-26 NOTE — TELEPHONE ENCOUNTER
Action Requested: Summary for Provider     []  Critical Lab, Recommendations Needed  [] Need Additional Advice  []   FYI    []   Need Orders  [] Need Medications Sent to Pharmacy  []  Other     SUMMARY: appt scheduled today with CALRY VILLALOBOS, pt requested a

## 2020-06-26 NOTE — TELEPHONE ENCOUNTER
RN pls call pt and triage or offer ov if needed, thanks.        Future Appointments   Date Time Provider Danna Mojica   3/25/2021 10:40 AM [facility]

## 2020-06-26 NOTE — TELEPHONE ENCOUNTER
See TE 6-26-20.     Future Appointments   Date Time Provider Danna Mojica   3/25/2021 10:40 AM Luanne Lares MD [facility]

## 2020-06-26 NOTE — PROGRESS NOTES
HPI:    Patient ID: Anthony Dubon is a 23year old female. HPI   Pt presents with sx of dull headache and intermittent short term memory problems. Miladys Watersney in her shower two weeks ago and hit her head on the toilet.  No LOC, did have pain and a cut on h Hashimoto's   • Diabetes Paternal Grandfather         Type 2   • Diabetes Other         PGGM   • Heart Disease Maternal Grandfather          @ 46 yrs   • Cancer Maternal Grandmother 64        Breast   • Cancer Maternal Aunt         DCIS   • Cancer Mamie Rivas unremarkable, no neuro deficits noted. - CT BRAIN OR HEAD (19423); Future    2.  Headache in front of head  -sx likely of concussion, discussed this with patient, advised imaging and if new sx or worsening sx develop over the weekend advised ER ASAP   - C

## 2020-06-26 NOTE — TELEPHONE ENCOUNTER
Pt states she received Dr Comfort Mckeon my chart message to go to ER. Pt states she doesn't want to go to ER due to cost.  Advised pt if headaches are severe or dizziness may need CT scan asap.   Pt verb understanding and states if symptoms worsen she will go t

## 2020-06-26 NOTE — TELEPHONE ENCOUNTER
Please refer to nurse triage TE 06/26/20 as well as office visit encounter with Jacquelin Mac.  Patient was evaluated for symptoms in office

## 2020-06-26 NOTE — TELEPHONE ENCOUNTER
From: Robbi Meehan  To: Adis Alvarez MD  Sent: 6/25/2020  8:29 PM CDT  Subject: Other    2 weeks ago I fell in the shower and hit my head on the toilet. I’m experiencing constant headaches, and forgetting what I say sometimes.  My head is very pressu

## 2020-06-26 NOTE — TELEPHONE ENCOUNTER
From: Lian Bird  To: Dylon Beavers MD  Sent: 6/25/2020 8:29 PM CDT  Subject: Other    2 weeks ago I fell in the shower and hit my head on the toilet. I’m experiencing constant headaches, and forgetting what I say sometimes.  My head is very pressur

## 2020-08-27 NOTE — PROGRESS NOTES
Anthony Dubon is a 23year old female.   Patient presents with:  Bump: right side of head      HPI:   Patient comes as an urgent visit  C/C  C/O 4 days ago was sitting outside and noticed a bump on the back of her head and the nexxt day was a little mo palpitations, swelling in feet  GI: denies abdominal pain and denies heartburn, nausea or vomiting   NEURO: + right sided  Headaches rare - right sided  ,no  Anxiety or  depression    EXAM:   /79 (BP Location: Right arm, Patient Position: Sitting, Cu

## 2020-08-27 NOTE — PATIENT INSTRUCTIONS
Understanding Headache Pain  Headache pain can start in different structures in the head. The brain itself doesn't hurt, but other parts of the head do. Headache is a common symptom of illness, such as a cold or the flu.  At other times, headaches happen

## 2020-11-23 NOTE — TELEPHONE ENCOUNTER
----- Message from [id number] Anel Umanzor sent at 11/23/2020 12:45 PM CST -----  Regarding: Non-Urgent Medical Question  Contact: [phone]  I have been experiencing that I have to go the bathroom all the time.  I always have to pee and It’s been like that fo

## 2020-11-24 NOTE — PROGRESS NOTES
Patient ID: Brent Parker is a 23year old female. Patient presents with:  UTI: frequent urination. Discomfort.    Low Back Pain       HISTORY OF PRESENT ILLNESS:   HPI  Pt presents with dysuria  Onset - 3 day(s) ago  Pain - moderate  Fevers - No, ch Needs      Financial resource strain: Not on file      Food insecurity        Worry: Not on file        Inability: Not on file      Transportation needs        Medical: Not on file        Non-medical: Not on file    Tobacco Use      Smoking status: Never S 98 °F (36.7 °C)   TempSrc: Temporal   Weight: 153 lb (69.4 kg)   Height: 5' 5\" (1.651 m)       Body mass index is 25.46 kg/m². Physical Exam   Constitutional: She appears well-developed and well-nourished.    Cardiovascular: Normal rate, regular rhythm

## 2020-11-24 NOTE — PATIENT INSTRUCTIONS
Dysuria  Dysuria is when you have pain during urination. It is often described as a burning feeling. Learn more about this problem and how it can be treated. Painful urination (dysuria) is often caused by a problem in the urinary tract.    What cause · New or increased discharge from the vagina or penis  · Rash or joint pain  · Increased back or belly pain  · Enlarged painful lymph nodes (lumps) in the groin  [place] last reviewed this educational content on 4/1/2019  © 6455-3259 [employer], © 4454-0351 [employer] [address]. [address] [facility], [facility] [employer] [place]. All rights reserved. This information is not intended as a substitute for professional medical care. Always follow your healthcare professional's instructions.

## 2020-11-24 NOTE — TELEPHONE ENCOUNTER
OV Dr Duncan American today Shelagh Odor 12:15. Advised to wear mask, at door screening, care partner policy, drink fluids; patient verbalized understanding. Patient stated urinary frequency/urgency, dysuria with low back pain.  No fever, nausea, vomiting diarrhea, si

## 2021-03-26 NOTE — PROGRESS NOTES
Staci Pendleton is a 24year old female [place] Patient's last menstrual period was 03/09/2021. here for annual exam.       Last seen 3/24/2020. Last pap in 3/2020 --ASCUS with neg HPV. Pt wants to repeat pap today. Menses q month. Condoms.   Dec ALLERGIES:    Brevital Sodium [Me*    PALPITATIONS, OTHER (SEE COMMENTS)    Comment:Shut down central nervous system.       Review of Systems:  Constitutional:  Denies fatigue, night sweats, hot flashes  Eyes:  denies blurred or double vision  Cardiov Plan:    Maryam Hagen is a 24year old female who presents for an annual physical exam.    1. Encounter for gynecological examination  Pap done. ASCCP guidelines reviewed.    Encouraged annual exam.   RTC 1 year or prn        Requested Prescriptions

## 2021-04-10 NOTE — TELEPHONE ENCOUNTER
Dr. Osmani Rodriguez, please see patient email. Saw chiropractor 6/20/19 for back pain.  Referral pending for your review and approval.

## 2021-04-10 NOTE — TELEPHONE ENCOUNTER
From: Jaspal Contreras  To: Rosita Ferreira MD  Sent: 4/9/2021 7:22 PM CDT  Subject: Referral Request    I am in need of a referral to see Doctor Antoinette Preciado, who is a chiropractor who I had seen in the past for my back.  I get adjustments when I see him which r

## 2021-04-12 NOTE — TELEPHONE ENCOUNTER
Dr Florian Rader, patient has seen chiropractor Dr Tracy Navas for back pain, he has completed spine xrays and recommended she contact her PCP for an MRI order, please advise  Please reply to pool: EM TRIAGE SUPPORT

## 2021-04-12 NOTE — TELEPHONE ENCOUNTER
Dr Brooklyn Pham, patient would like referral to Dr Alfred Al for back pain, her next appt is Friday 4/16/21 please see pending referral  Please reply to pool: EM TRIAGE SUPPORT

## 2021-04-13 NOTE — TELEPHONE ENCOUNTER
Mri thoracic spine  7/25/19  Impression   CONCLUSION:   1. Essentially unremarkable noncontrast MR appearance of the thoracic spine, with the exception of minimal disc degeneration at T6-T7. No evidence of acute fracture or significant neural compromise.

## 2021-04-13 NOTE — TELEPHONE ENCOUNTER
Note  Patient was seen by physiatry Dr. Jaime Chung  I suggest follow-up with Dr. Maryan Davies if she is not improving

## 2021-04-13 NOTE — TELEPHONE ENCOUNTER
From   Julio C Kevin MD To   Chucho Muñoz and Delivered   4/12/2021 10:42 PM   Last Read in [address]   4/12/2021 10:58 PM by Elvi Reyna

## 2021-04-13 NOTE — TELEPHONE ENCOUNTER
MRI lumbar spine 7/15/2019  Impression   CONCLUSION:   1. No acute disease. 2. Multiple small Schmorl's nodes. 3. Limited range of motion but no abnormal motion.

## 2021-04-15 NOTE — PROGRESS NOTES
Ana Bennett is a 24year old female.   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

## 2021-05-06 NOTE — TELEPHONE ENCOUNTER
From: Mert Fuentes  To: Jose Jc MD  Sent: 5/5/2021 7:52 PM CDT  Subject: Referral Request    I received the results from my MRI, and with that I would like to go back to see Dr. Radha Nguyen to discuss either injections and/or further treatments.  Jesenia

## 2021-05-06 NOTE — TELEPHONE ENCOUNTER
OK with me    Shea Ramirez.  Santo Holden MD, [address] [facility]  Physical Medicine and Rehabilitation/Sports Medicine  MEDICAL CENTER [facility]

## 2021-05-17 PROBLEM — R29.898 SHOULDER WEAKNESS: Status: ACTIVE | Noted: 2021-05-17

## 2021-05-17 PROBLEM — M51.9 LUMBAR DISC DISEASE: Status: ACTIVE | Noted: 2021-05-17

## 2021-05-17 PROBLEM — M51.9 THORACIC DISC DISEASE: Status: ACTIVE | Noted: 2021-05-17

## 2021-05-17 PROBLEM — M51.26 LUMBAR HERNIATED DISC: Status: ACTIVE | Noted: 2017-07-11

## 2021-05-17 PROBLEM — M50.90 CERVICAL DISC DISEASE: Status: ACTIVE | Noted: 2021-05-17

## 2021-05-17 NOTE — PROGRESS NOTES
Low Back Pain H & P    Chief Complaint: Patient presents with:  Back Pain: LOV: 2017 with Dr. Johnson Uriah: 2020 Dr. Cathi Starks. MRI 2021. Patient was seen by Dr. Cathi Starks in past. Patient had trigger point injecitons done, and did PT, and MRI done.  Pa This pain ranges from 5-10/10 and it is a tightness and throbbing pain. This is a constant pain and currently it is a 6-7/10. Description of the Pain - Lumbar  · The pain is located in the bilateral low back. · The pain does not radiate. .  · The p name: Not on file      Number of children: Not on file      Years of education: Not on file      Highest education level: Not on file    Occupational History      Not on file    Tobacco Use      Smoking status: Never Smoker      Smokeless tobacco: Never Us Meetings:       Marital Status:   Intimate Partner Violence:       Fear of Current or Ex-Partner:       Emotionally Abused:       Physically Abused:       Sexually Abused:     Review of Systems  Patient-reported ROS  Constitutional  Sleep Disturbance: admi Head: In neutral   Spinous Processes Palpations: Tender at C2, C3, C4, C5, T3, T4 and T5   Z-Joints Palpations: Tender at  left C5-6   Muscular Palpations: Non-tender to palpation.   Bilateral upper trapezius and right rhomboid muscles are tight   Cervica muscles   Greater Trochanteric Bursa: Non-tender for bilateral Greater Trochanteric Bursa   She has a right pelvic down slip.     Vascular lower extremity:   Dorsalis pedis pulse-RIGHT 2+   Dorsalis pedis pulse-LEFT 2+   Tibialis posterior pulse-RIGHT 2+ therapy combined with the chiropractic manipulations. The patient does not need any pain medications at this time. She might benefit from cervical or thoracic z-joint injections in the future. She will follow up after doing the therapy.     The tot

## 2021-05-19 NOTE — TELEPHONE ENCOUNTER
Dein Draper,    Dr. Vasile Flores office is requesting a referral for additional visits. Please advise and sign off on referral if you agree. Thank you, Aries Fine Specialist    [facility] Care.

## 2021-05-19 NOTE — TELEPHONE ENCOUNTER
Pt is calling and states she needs updated referrals. She will be seeing Dr Naoma Dance for physical therapy and Dr Dale Ordonez for adjustments only. New referrals pended for review/completion. Pt requesting call back once completed.

## 2021-05-21 NOTE — TELEPHONE ENCOUNTER
Clinicals submitted to [facility] for Physical Therapy
Flor Hong, [address] [facility] Referral Pool  Has patient used all approved visits to [facility] (referral # [id number])? Patient can not have chiro and PT services at the same time for the same DX.       Contacted [place] at Dr. Salud Horn office who states
Physical Therapy  CPT Codes: 86120,[id number]  MD clinical notes pending . Will send to [facility] once available.
Received notice of Approval from [address] [place] [address] at [place] [phone] for 8 visits of PT valid until 8/21/21 to be done at [address] [place] [address]    Patient is already scheduled.
moderate assist (50% patients effort)
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
## 2021-06-14 NOTE — PROGRESS NOTES
6/14/2021  Dx:      Lumbar herniated disc (M51.26)  Cervical disc disease (M50.90)  Lumbar disc disease (M51.9)  Lumbar spondylosis (M47.816)  Pain in left acromioclavicular joint (M25.512)  Thoracic facet joint syndrome (M47.894)  Mid back pain (M54.9)  L Home exercise program instruction; TNE Education; Modalities as needed.     Education or treatment limitation: None  Rehab Potential good      Certification From: 2/21/0744  To:9/8/2021        Charges: NM3      Total Timed Treatment: 45 min  Total Treatment decrease pain, improve her functional mobility    Past medical history was reviewed with Kamille Ashley. Significant findings include ACL R, ligament repair R thumb. Took 1.5 years to recover from the ACL.        Are you being hurt, frightened, demeaned, or taken Piriformis WFL WFL   Hip Flexor long long   TFL short short     LE ROM:    R L   ER 45 45   IR 45 45     Neuro Screen: (-) heel walking and toe walking      Outcome Surverys  Date    FOTO See FOTO   Modified Oswestry    FABQ          Today’s Treatment an

## 2021-06-16 NOTE — TELEPHONE ENCOUNTER
Pt cannot do both forms of therapy at the same time. Pt has to complete all of physical therapy prior to getting referral for Dr. Brittnee Babcock.   Please advise pt

## 2021-06-21 NOTE — PROGRESS NOTES
6/21/2021  Dx:      Lumbar herniated disc (M51.26)  Cervical disc disease (M50.90)  Lumbar disc disease (M51.9)  Lumbar spondylosis (M47.816)  Pain in left acromioclavicular joint (M25.512)  Thoracic facet joint syndrome (M47.894)  Mid back pain (M54.9)  L to discontinue her previous HEP unless she felt like she needed to perform those exercises. Goals: The pt was educated on the plan of care, purpose and individual goals for therapy, precautions for therapy. All questions were answered.      1.  T her back will get sore as well.  and Abiodun Koenig. 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 States she gets sharp/shooting pains. Pain with sleeping.   Can walk for a short time but has pain across the low back if she walk WFL    L Sidebend WFL    R Rotation NT    L Rotation NT    R Sideglide WFL    L Sideglide WFL        CARYN Testing R L   Cervical Axial Rotation NT NT   Apical Expansion decreased decreased   Adduction Drop Test - +   Extension Drop Test NT NT   SLR 65 051
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
## 2021-07-26 NOTE — PROGRESS NOTES
7/26/2021  Dx:      Lumbar herniated disc (M51.26)  Cervical disc disease (M50.90)  Lumbar disc disease (M51.9)  Lumbar spondylosis (M47.816)  Pain in left acromioclavicular joint (M25.512)  Thoracic facet joint syndrome (M47.894)  Mid back pain (M54.9)  L danger. Peripheral nerve sensitization, hyperalgesia and allodynia were explained using metaphors to promote deep learning.   Education: Patient educated on the relationship between emotions and pain, and the role that fear, catastrophization, nociception a 90 day period. Treatment will include: Manual Therapy; Therapeutic Exercises; Neuromuscular Re-education; Therapeutic Activity; Patient education: Home exercise program instruction; TNE Education; Modalities as needed.     Education or treatment limitatio limited ability to sleep, limited walking tolerance. Mera Heredia describes prior level of function independent in all activities. Pt goals include decrease pain, improve her functional mobility    Past medical history was reviewed with Mera Heredia.  Significant f (L5) 5 5    Ankle PF (S1) 5 5    Hip Abduction NT NT    Hip Extension NT NT      Flexibility:      R L   Hamstrings 90 degrees 90 degrees   Piriformis WFL WFL   Hip Flexor long long   TFL short short     LE ROM:    R L   ER 45 45   IR 45 45     Neuro Scree

## 2021-07-26 NOTE — PROGRESS NOTES
Low Back Pain H & P    Chief Complaint: Patient presents with:  PT Follow-Up: LOV 05/17/2021. She is doing therapy with Ann Medina but she does not know if it is CARYN therapy. She is going 1x weekly with home exercises.  Her therapist sees inprovement but patient four)       Family History   Family History   Problem Relation Age of Onset   • Cancer Mother 43        Breast cancer DCIS   • Thyroid Disorder Mother 52        Hashimoto's   • Diabetes Paternal Grandfather         Type 2   • Diabetes Other         PGGM Insecurity:       Worried About [address] in the Last Year:       Ran Out of Food in the Last Year:   Transportation Needs:       Lack of Transportation (Medical):       Lack of Transportation (Non-Medical):   Physical Activity:       Days of Exe paraspinal muscles   Lumbar Extension: 15 degrees Gives the patient pain in the central low back.      Lumbar Spine Palpation:    Spinous Processes: Tender at L4, L5 and S1   Z-joints: Tender at  left L4-5 and left L5-S1   SIJ: Non-tender for bilateral SIJ understands and agrees with the stated plan. Fauzia Ricks MD  7/26/2021

## 2021-07-26 NOTE — PATIENT INSTRUCTIONS
Plan  I will perform bilateral S1 TFESI(s). She will get back into the PT if this helps. If the injections do not help, then I will do bilateral L4-5 and L5-S1 z-joint injections. The patient does not need any pain medications at this time.     Martha Garza

## 2021-07-29 NOTE — TELEPHONE ENCOUNTER
LMTCB. Patient to be scheduled for Bilateral S1 TFESI at the [address]  with .    -Anesthesia type: local.

## 2021-07-29 NOTE — TELEPHONE ENCOUNTER
Received in basket from [email or web address]   advising of approval for Bilateral S1 TFESI for one unit/DOS. Will inform Nursing. Berna Domingo

## 2021-07-29 NOTE — TELEPHONE ENCOUNTER
Patient has been scheduled for Bilateral S1 TFESI on 8/10/21 at the [facility] with Candelaria Moy.    -Anesthesia type: local.    -If receiving MAC or IVC sedation patient will need to get COVID tested 3 days prior (order placed by [facility].) n/a local  -Patient informed

## 2021-08-10 NOTE — PROCEDURES
[address]    BILATERAL S1 TFESI   NAME:  Jan Cushing    MR #:    [id number] :  3/26/2000     PHYSICIAN:  Chi Andrew MD        Operative Report    DATE OF PROCEDURE: 8/10/2021   PREOPERATIVE DIAGNOSES: 1. bilateral L5 signs were monitored and they remained completely stable. Also, throughout the whole procedure, prior to injection of any medication, aspiration was performed. No blood, fluid, or air was aspirated at anytime.

## 2021-08-24 NOTE — TELEPHONE ENCOUNTER
Pt calling to update Dr. Ashleigh Car on how she's doing after her injection. States she is experiencing some relief in lower back. Still feeling pain in mid back/upper back and neck. Noticing more what aggravates her back.    States that numbness and tingling

## 2021-08-24 NOTE — TELEPHONE ENCOUNTER
Condition update after injection.    - Patient had Bilateral S1 TFESI (specific injection) on 08/10/21 (date) with Murali Strong.   - Pain level prior to injection: 9/10  -Current pain level: 5/10.   - 40% relief   - Pain location: back  -NOV: 10/25/21  - Aleve

## 2021-08-30 NOTE — TELEPHONE ENCOUNTER
Submitted request to [facility] KATERINE for Bilateral S1 TFESIs CPT [id number] to be done at [facility]    Status: pending [facility] clinical review

## 2021-08-31 NOTE — TELEPHONE ENCOUNTER
Received in basket from [email or web address]    advising of approval for Bilateral S1 TFESI for one unit/DOS. Will  inform Nursing.

## 2021-09-17 NOTE — PROCEDURES
[address]    BILATERAL S1 TFESI   NAME:  Ernestina Hudson    MR #:    [id number] :  3/26/2000     PHYSICIAN:  Quin Romero MD        Operative Report    DATE OF PROCEDURE: 2021   PREOPERATIVE DIAGNOSES: 1. bilateral L5 signs were monitored and they remained completely stable. Also, throughout the whole procedure, prior to injection of any medication, aspiration was performed. No blood, fluid, or air was aspirated at anytime.

## 2021-09-27 NOTE — TELEPHONE ENCOUNTER
See telephone encounter on  9/27/21. From: Lis Monge  To: Sandi Whitman MD  Sent: 9/26/2021  9:28 PM CDT  Subject: Possible Infection    I noticed on my right knee on my scar from my ACL surgery (surgery was about 5 years ago).  That I had a

## 2021-09-27 NOTE — TELEPHONE ENCOUNTER
See also attached pictures -      ---- Message from [id number] Anel Umanzor sent at 9/26/2021  9:28 PM CDT -----  Regarding: Possible Infection  I noticed on my right knee on my scar from my ACL surgery (surgery was about 5 years ago).  That I had a blood blister

## 2021-09-27 NOTE — TELEPHONE ENCOUNTER
Action Requested: Summary for Provider     []  Critical Lab, Recommendations Needed  [] Need Additional Advice  []   FYI    []   Need Orders  [] Need Medications Sent to Pharmacy  []  Other     SUMMARY:     Patient was called  Describes her blood blister o

## 2021-10-04 NOTE — TELEPHONE ENCOUNTER
Pt calling to notify Dr. Ayleen Maria that after her 2nd injection she experienced the same amount of relief she felt after her first injection. she is not feeling too much better.

## 2021-10-04 NOTE — TELEPHONE ENCOUNTER
Condition update after injection.    - Patient had Bilateral S1 TFESI on 09/17/21  with Caesar Westover. - Pain level prior to injection: 6/10  -Current pain level: 6/10.   - 0% relief   - Pain location: low back radiating to bilateral hips.   - Pain description:

## 2021-10-05 NOTE — TELEPHONE ENCOUNTER
Submitted request to [facility] KATERINE for bilateral L4-5 and L5-S1 z-joint injections CPT [id number] to be done at [facility]    Status: pending [facility] clinical review

## 2021-10-06 NOTE — TELEPHONE ENCOUNTER
Received in basket from SKINNY [email or web address]    advising of approval for Bilateral L4-5 and L5-S1 Z-joint injections CPT [id number] x 2 . Will inform Nursing.

## 2021-10-07 NOTE — TELEPHONE ENCOUNTER
LMTCB. 1st attempt  Patient to be scheduled for Bilateral L4-5 and L5-S1 Z-joint injections at the [facility] with Eileen Santos.    -Anesthesia type: local.

## 2021-10-07 NOTE — TELEPHONE ENCOUNTER
Patient has been scheduled for Bilateral L4-5 and L5-S1 Z-joint injections on 10/22/21 at the [address] [place] [address] with Marin Gitelman.    -Anesthesia type: local.  -If receiving MAC or IVC sedation patient will need to get COVID tested 3 days prior even if already vaccinated (o

## 2021-10-22 NOTE — PROCEDURES
[facility] Z-JOINT/FACET INJECTIONS  NAME:  Hamida Peoples    MR #:    [id number] :  3/26/2000     PHYSICIAN:  Gloria Solorio MD        Operative Report    DATE OF PROCEDURE: 10/22/2021   PREOPERATIVE DIAGNOSES: 1. patient's pulse oximetry and vital signs were monitored and they remained completely stable. Also, throughout the whole procedure, prior to injection of any medication, aspiration was performed. No blood, fluid, or air was aspirated at anytime.

## 2021-11-02 NOTE — TELEPHONE ENCOUNTER
S/w patient, had bilateral L4-5 and L5-S1 Z-joint/facet injection on 10/22. Pain score prior to injection: 7/10. Reports about 25 % relief.  5/10 sharp, shooting pain to lower back radiates to bilateral hips and is triggered by activity and positioning such

## 2021-11-08 NOTE — TELEPHONE ENCOUNTER
Submitted request to [facility] for Physical Therapy CPT [id number]     Status: pending [facility] clinical review

## 2021-11-08 NOTE — PROGRESS NOTES
Low Back Pain H & P    Chief Complaint: Patient presents with:  Low Back Pain: LOV: 10/22/21 Patient f/u on bilateral low back pain that radiates into both hips, occ N/T. Takes Aleve. Rates pain 4/10.   States 40% improvement after 3rd z joint facet injec History   Family History   Problem Relation Age of Onset   • Cancer Mother 43        Breast cancer DCIS   • Thyroid Disorder Mother 52        Hashimoto's   • Diabetes Paternal Grandfather         Type 2   • Diabetes Other         PGGM   • Heart Disease Mat     Worried About Running Out of Food in the Last Year: Not on file      Ran Out of Food in the Last Year: Not on file  Transportation Needs:       Lack of Transportation (Medical): Not on file      Lack of Transportation (Non-Medical):  Not on file  Phys Thoracic levoscoliosis that is mild and Lumbar dextroscoliosis that is mild   Lumbar Flexion: 110 degrees Painless   Lumbar Extension: 20 degrees Gives the patient pain in the central low back.      Lumbar Spine Palpation:    Spinous Processes: Tender at L5 medications at this time. The total office visit face-to-face visit time was at least 30 minutes with over half of the time spent discussing patient care and treatment. The patient understands and agrees with the stated plan. Quin Romero MD  11/

## 2021-11-08 NOTE — PATIENT INSTRUCTIONS
Plan  She will get back into the PT for the lumbar and cervical and thoracic spines. We discussed that from here going forward, she will most likely have a gradual healing process of the spine that is best treated with PT.     She will follow up in 3 m

## 2021-12-01 NOTE — TELEPHONE ENCOUNTER
Keegan at [facility] Approved 8 PT visits valid until 11/8/22    Patient is already scheduled

## 2021-12-09 NOTE — TELEPHONE ENCOUNTER
Action Requested: Summary for Provider     []  Critical Lab, Recommendations Needed  [x] Need Additional Advice  []   FYI    []   Need Orders  [] Need Medications Sent to Pharmacy  []  Other     SUMMARY: Since August 2021 patient has been getting on and of

## 2021-12-09 NOTE — TELEPHONE ENCOUNTER
I can see her tomorrow in office at 120–overbook  Please let her know that she may need to wait a little bit

## 2021-12-10 NOTE — TELEPHONE ENCOUNTER
Pt calling to report that she has been experiencing some irregular periods. Pt stated that prior to august, her periods were monthly and lasted about 6 days.  Pt stated that her period in August was 8 days and then in September, October, and November she ha

## 2021-12-10 NOTE — PROGRESS NOTES
Anthony Dubon is a 24year old female.   Patient presents with:  Weight Loss  Irregular Periods      HPI:   Pt comes for f/u  C/c wt and irregegualr periods and abd pains   C/o unintentional wt loss from aug to oct of this yr -- 15 lb wt loss  hasnt be Procedure: ULNAR COLLATERAL LIGAMENT REPAIR;  Surgeon: Raul Horowitz MD;  Location: [address] [place] [address]   • Other surgical history Right 4/26/16    ACL repair    • Other surgical history Right 5/2015    Ligament repair right thumb   • Other surgical Future  -     FREE T4 (FREE THYROXINE); Future    Weight loss  -     COMP METABOLIC PANEL (14); Future  -     ASSAY, THYROID STIM HORMONE; Future  -     CBC, PLATELET; NO DIFFERENTIAL;  Future  -     TISSUE TRANSGLUTAMINASE AB IGG; Future  -     TISSUE BULLARD

## 2021-12-13 NOTE — TELEPHONE ENCOUNTER
Prolactin ordered. Pt informed of recs and advised to get this drawn at the lab. Pt verbalized understanding.

## 2021-12-13 NOTE — TELEPHONE ENCOUNTER
Wing Mistry from lab stated that pts blood that was drawn on 12/10 is only good for 48 hours. Do you want pt to come back for prolactin lab draw?

## 2021-12-18 NOTE — TELEPHONE ENCOUNTER
RN pls call pt and f/u  or offer ov if needed, thanks. From: Elvi Reyna  To:  Onnie Eisenmenger, MD  Sent: 12/17/2021  8:00 PM CST  Subject: Visit follow up    I’m feeling very weak, I got my period again and it’s becoming everyday I have blood n

## 2021-12-18 NOTE — TELEPHONE ENCOUNTER
From: Marni Hawley  To: Chago Rodriguez MD  Sent: 12/17/2021 8:00 PM CST  Subject: Visit follow up    I’m feeling very weak, I got my period again and it’s becoming everyday I have blood now.  I weighed myself yesterday and I lost another 2 pounds since

## 2021-12-19 NOTE — TELEPHONE ENCOUNTER
All her blood work was overall normal last week , she was not anemic , her full panel thyroid function  was normal   Her vital signs including bp were good at the visit   Is she in pain anywhere?  She may benefit from anther visit for a second look

## 2021-12-20 NOTE — TELEPHONE ENCOUNTER
Noted GI and GYN appointments already made  If symptoms persist then she would benefit from an in office visit with avail provider

## 2021-12-20 NOTE — TELEPHONE ENCOUNTER
Spoke with pt,  verified. Pt was informed of MD recommendation. Pt stated she is nauseous and still has abd pain after eating. Pt tried not to eat that much. pls send med to [facility].   pls advise, thanks in advance.          A,liite bit after

## 2021-12-20 NOTE — TELEPHONE ENCOUNTER
Spoke to pt. She states that she gets weak and dizzy more than normal but has been trying to make herself eat more to help with this issue. She stated that gluten has been bothering her and causing stomach pain. She tries to eat as much as she can.  She is

## 2021-12-20 NOTE — TELEPHONE ENCOUNTER
She feeling nauseated? I can send a month medicine if that is the case  Does she have abdominal pain?   We can consider having her evaluated by GI  I have ordered a stool test for her, please ask her to get this done in the meantime  pls see other msg from

## 2021-12-21 NOTE — TELEPHONE ENCOUNTER
Noted she responded yest , has apt with gi and gyn   Await stool results   Will give trial of zofran so that she can eat   F/u apt highly rec if sympt continue even after this

## 2021-12-21 NOTE — ADDENDUM NOTE
Addended by: Timbo Murphy on: 12/20/2021 04:00 PM     Modules accepted: Orders
Addended by: William Merlos on: 12/21/2021 02:09 PM     Modules accepted: Orders
Clear

## 2021-12-22 NOTE — TELEPHONE ENCOUNTER
Patient has read [employer] message  Dr message  Message [phone]  From   Reagan Anderson RN To   Kristin Curran and Delivered   12/21/2021  3:46 PM   Last Read in [address]   12/21/2021  3:51 PM by Ana Bennett

## 2021-12-27 NOTE — TELEPHONE ENCOUNTER
PT order placed on 11/8/21 by Ana Laura Campa. Auth was only valid until 12/31/21. Patient has PT sessions scheduled to start the beginning of the year 1/3/22- 1/31/22.     Will forward message on to referrals team to see if authorization date can be adjusted o

## 2021-12-28 NOTE — TELEPHONE ENCOUNTER
Christo Menard  Good afternoon and happy Tuesday    The referral request for 2022 physical Therapy cannot be on a pre-existing 2021 referral. Please submit a new referral for 2022 therapy.      Thank you in advance :)     New PT order place
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
## 2022-01-07 NOTE — PROGRESS NOTES
1/7/2022  Dx:     Cervical disc disease (M50.90)  Lumbar disc disease (M51.9)  Thoracic disc disease (M51.9)  Lumbar radiculopathy (M54.16)  Lumbar herniated disc (M51.26)       Authorized # of Visits: 8 visits auth          Next MD visit: none   Fall Risk The pt was educated on the plan of care, purpose and individual goals for therapy, precautions for therapy. All questions were answered. 1.  The pt will be independent in their HEP. 2.  Centralization of symptoms to the lumbar spine.   3.  The pt yoga secondary to pain with extension based exercises. Also gets pain in the upper back and base of her neck, especially after working. Started Pilates per Dr. Shaunna Tuttle instruction, twice a week.   Feels that it is helpful but doesn't lift her head awais bilateral lower rib flares and hyperextension at the knee. She will benefit from skilled PT to return to her PLOF.     Precautions: recent weight loss     OBJECTIVE:   Observation/Posture: anterior pelvic tilt with toe out position and hyperextension at th

## 2022-01-10 NOTE — PROGRESS NOTES
1/10/2022  Dx:     Cervical disc disease (M50.90)  Lumbar disc disease (M51.9)  Thoracic disc disease (M51.9)  Lumbar radiculopathy (M54.16)  Lumbar herniated disc (M51.26)       Authorized # of Visits: 8 visits auth          Next MD visit: none   Fall Ris Assessment: No adverse effects to treatment. The pt displays significant myospasm in the R rhomboid and thoracic paraspinals.   She also displays increased R lat dorsi recruitment with lower rib flares and difficutly using the IO/TA without recru stabilization and HEP. Seh responds to flexion bases lumbar McKen  zie techniques. ,    Date of Service: 1/3/2022   Date of Onset: 2015    PATIENT SUMMARY   Crista Morrow is a 24year old y/o female who presents to therapy today with L sided LBP with No      ASSESSMENT:   Miss Rafael Maradiaga presents to therapy with a hx of LBP and now L hip pain since 2015. She reports 50% improvement from the injections done by Dr. Marino Crespo.   She currently presents in a L AIC pattern with bilateral BC issues that contribute h FOTO Refer to FOTO   Modified Oswestry    FABQ          In agreement with FOTO score and clinical rationale, this evaluation involved Moderate Complexity decision making due to 3+ personal factors/comorbidities, 4+ body structures involved/activity limit

## 2022-01-14 NOTE — PROGRESS NOTES
1/14/2022  Dx:     Cervical disc disease (M50.90)  Lumbar disc disease (M51.9)  Thoracic disc disease (M51.9)  Lumbar radiculopathy (M54.16)  Lumbar herniated disc (M51.26)       Authorized # of Visits: 8 visits auth          Next MD visit: none   Fall Ris over swiss ball with IO/TA    Modified all 4 belly lift with arm lifts    PME expansion with IO/TA on swiss ball    Anterior neck inhibition over the swiss ball R lat dorsi elongation using the door frame            Assessment: No adverse effects to treatm advance to stabilization. Emphasis on stabilization and HEP. Seh responds to flexion bases lumbar McKen  zie techniques. ,    Date of Service: 1/3/2022   Date of Onset: 2015    PATIENT SUMMARY   Ruth Giang is a 24year old y/o female who presents you tried to hurt yourself in the past?  No      ASSESSMENT:   Miss Baldev Prieto presents to therapy with a hx of LBP and now L hip pain since 2015. She reports 50% improvement from the injections done by Dr. Parker Lauren.   She currently presents in a L AIC pattern wi (+) Slump L    Outcome Surverys  Date    FOTO Refer to [employer]    FABQ          In agreement with FOTO score and clinical rationale, this evaluation involved Moderate Complexity decision making due to 3+ personal factors/comorbidities, 4+

## 2022-01-14 NOTE — H&P
History and Physical      Leslie Churchill is a 24year old female. HPI   Patient presents with:  Gallbladder: Pt referred by Dr. Adam Nunez regarding gallstones since Aug.  Pt c/o nausea but no vomitting. Pt denies diarrhea or constipation.   Pt has h used    Substance and Sexual Activity      Alcohol use: No      Drug use: No      Sexual activity: Yes        Birth control/protection: Condom    Family History   Problem Relation Age of Onset   • Cancer Mother 43        Breast cancer DCIS   • Thyroid Diso - 10.1 mg/dL 9.8    Calculated Osmolality   275 - 295 mOsm/kg 286    GFR, Non-   >=60 126    GFR, -American   >=60 145    ALT   13 - 56 U/L 16    AST   15 - 37 U/L 11 Low     Alkaline Phosphatase   52 - 144 U/L 42 Low     Bilirubin, from GI and GYNE, to r/o other explanations for her sx, or other more pressing medical issues.        1/14/2022  Zari Keenan MD
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
## 2022-01-18 NOTE — PATIENT INSTRUCTIONS
Abdominal pain/irregular bowel habits  - Likely IBS   - FODMAP diet  - colonoscopy and EGD with MAC and Miralax prep  - dicyclomine as needed before meals  - see gynecology  - follow up with Dr. Víctor Rosen after above workup ( gallbladder)

## 2022-01-18 NOTE — TELEPHONE ENCOUNTER
Scheduled for:  Colonoscopy [address] ,[address]   Provider Name:  Dr. Chaitanya Liu  Date:  1/24/22  Location:  [facility]  Sedation:  Mac   Time: 0800 Am (pt is aware to arrive at 0700 Am )   Prep: Miralax +Gatorade+ 2 tab dulcolax/egd Prep instructions were given to pt i

## 2022-01-20 NOTE — PROGRESS NOTES
Dannie Cruz is a 24year old female [facility] Patient's last menstrual period was 12/17/2021. Patient presents with:  Consult: IRREGULAR PERIODS SINCE AUGUST, LASTING DAYS LONGER THAN NORMAL OR WILL MISS PERIOD -     Last seen in 3/26/21.    Menses typ VITAMIN & MINERAL) Oral Tab Take by mouth. • dicyclomine 10 MG Oral Cap Take 1 capsule (10 mg total) by mouth 3 (three) times daily as needed.  (Patient not taking: Reported on 1/18/2022) 30 capsule 0   • ondansetron (ZOFRAN) 4 mg tablet Take 1 tablet (

## 2022-01-21 NOTE — TELEPHONE ENCOUNTER
----- Message from Amelie Tillman MD sent at 1/20/2022  9:49 PM CST -----  Pelvic ultrasound-- small right paratubal cyst 2.3 cm. Otherwise normal ultrasound. Not reason for pelvic pain. No need for further ultrasound.

## 2022-01-21 NOTE — PROGRESS NOTES
Pelvic ultrasound-- small right paratubal cyst 2.3 cm. Otherwise normal ultrasound. Not reason for pelvic pain. No need for further ultrasound.

## 2022-01-21 NOTE — PROGRESS NOTES
1/21/2022  Dx:     Cervical disc disease (M50.90)  Lumbar disc disease (M51.9)  Thoracic disc disease (M51.9)  Lumbar radiculopathy (M54.16)  Lumbar herniated disc (M51.26)       Authorized # of Visits: 8 visits auth          Next MD visit: none   Fall Ris were answered. 1.  The pt will be independent in their HEP. 2.  Centralization of symptoms to the lumbar spine.   3.  The pt will be independent in a modified workout program.  4.  The pt will be able to perform IO/TA activities without using her cervi working. Started Pilates per Dr. Jose Woody instruction, twice a week. Feels that it is helpful but doesn't lift her head because she experiences neck strain when lifting her head. Working as a hairdressor.   Tries to roll her shoulders back while working recent weight loss     OBJECTIVE:   Observation/Posture: anterior pelvic tilt with toe out position and hyperextension at the knees, FHP  Gait: decreased frontal plane control  ROM:     Trunk         Pain (+/-)   Flexion Fingertips to shoes

## 2022-01-22 NOTE — TELEPHONE ENCOUNTER
On call note    Pre-procedure outpatient COVID test +     Left message for pt regarding + test and need to cancel endoscopies next week. Will also send mychart note.     GI RN -- please assist pt in rescheduling with Dr Aakash Orr

## 2022-01-22 NOTE — TELEPHONE ENCOUNTER
Pt advised of below per JLK and can also f/u with PCP if pain still occurring. Pt agrees and states understanding.

## 2022-01-24 NOTE — TELEPHONE ENCOUNTER
Schedulers:  Please contactg patient to reschedule colonoscopy/EGD with Dr. Blanca Mitchell per protocol due to positive COVID 19 test (see office visit dated 1/18/2022 for the origional orders)    Left message for patient to call back.     Patient already removed fr

## 2022-01-24 NOTE — TELEPHONE ENCOUNTER
----- Message from Lee Ann Brown MD sent at 1/24/2022 11:57 AM CST -----  RN to contact pt and make sure aware that COVID positive and will need to reschedule.    Quarantine as per protocol-have her contact her primary physician for any further questions

## 2022-01-24 NOTE — TELEPHONE ENCOUNTER
Per Betty PAT/RN--     &  notified that pt is covid positive on 01/21/22.  left message for pt.        EGD/Colon with  on 01/24/22  will need to be rescheduled.     Cancelled for:  Colonoscopy [id number] ,[address]   Provider Name:

## 2022-01-26 NOTE — TELEPHONE ENCOUNTER
Rescheduled for: [address]   Provider Name:  Dr. Larose  Date:    FROM 1/24/22    TO 3/14/2022  Location:  [facility]  Sedation:  Mac   Time: FROM  0800       TO  1:00pm (pt is aware to arrive at 12:00pm)  Prep: Miralax +Gatorade   Meds/Allerg

## 2022-01-26 NOTE — TELEPHONE ENCOUNTER
From: Johan Orlando  To: uLdwin Armenta. Elena Craig MD  Sent: 1/26/2022 2:47 PM CST  Subject: Scheduling question    My colonoscopy / EGD got pushed back to March 14th due to getting covid.  I wanted to go ahead and schedule my gallbladder surgery to get that o

## 2022-01-26 NOTE — TELEPHONE ENCOUNTER
Dr. Ade Sidhu    Please advise on below message about timing gallbladder surgery around colonoscopy.     Thank you

## 2022-01-26 NOTE — TELEPHONE ENCOUNTER
GI Schedulers--    Patient sent an additional mychart requesting call back to reschedule colonoscopy/egd. Please follow up when appropriate. Orders attached below. Thank you!

## 2022-01-27 NOTE — TELEPHONE ENCOUNTER
PT Approved to be done at [address] [place] [address] valid until 4/1/22    Patient was already scheduled however was Covid positive 1/21/22-so remaining visits cancelled

## 2022-01-29 NOTE — TELEPHONE ENCOUNTER
Per chart review, pt has scheduled Gb surgery with Dr. Natali Kiser yesterday (1/28/2022) for DOS 4/5/2022. Pt has scheduled CLN on 3/14/2022 with Dr. Fredi Mcgowan.

## 2022-02-08 NOTE — TELEPHONE ENCOUNTER
Patient has questions about PT. She is having gall bladder surgery in April so right now she cant do PT until after the surgery.  F/U to discuss how Dr Shavonne Ortez wants her to f/u

## 2022-02-08 NOTE — TELEPHONE ENCOUNTER
LOV 11/18/21. LOV scheduled for yesterday cancelled by patient. NOV 4/11/22. Per chart review patient did attend PT in Jan.    To be scheduled for elective cholecystectomy. Patient reports can not do PT until after surgery. Attempted to contact patient, left message to return call to office.

## 2022-02-09 NOTE — TELEPHONE ENCOUNTER
Spoke to patient, states she has completed 6-7 sessions out of 8  PT sessions ordered by Dr Parker Lauren. States was told to hold any further PT visits by Aidee Julien her therapist due to abdominal pain. Has colonoscopy, EGD and cholesystectomy scheduled. Surgery 4/5/22 cholecystectomy. Has continued HEP and pilates. LOV  11/8/21. NOV 4/11/22.   Patient rescheduled NOV to 5/16/22 per her request.

## 2022-03-08 NOTE — PAT NURSING NOTE
Pt.positive for Covid on 1/21/22 with symptoms of congestion only. Pt.is currently asymptomatic. No need to retest for Covid as she is within 90 day window.

## 2022-03-14 NOTE — OPERATIVE REPORT
[facility] Endoscopy Report      Preoperative Diagnosis:  - Abdominal pain  - Weight loss  - Irregular bowel habits      Postoperative Diagnosis:  - internal hemorrhoids   - normal EGD      Procedure:    Colonoscopy   Esophagogastroduodenoscopy       Surgeon:  Esther Raines M.D. Anesthesia:  MAC     Technique:  After informed consent, the patient was placed in the left lateral recumbent position. Digital rectal examination revealed no palpable intraluminal abnormalities. An Olympus variable stiffness pediatric 190 series HD colonoscope was inserted into the rectum and advanced under direct vision by following the lumen to the cecum. The colon was examined upon withdrawal in the left lateral position. Following colonoscopy, an Olympus adult HD gastroscope was inserted into the hypopharynx and advanced under direct vision into the esophagus, stomach and duodenum. The endoscope was withdrawn to the stomach where retroflexion of the annulus, body, cardia and fundus was performed. The instrument was straightened, insufflated air and fluid were suctioned and the endoscope was withdrawn. The procedures were well tolerated without immediate complication. Findings:  The preparation of the colon was good. The terminal ileum was examined for 4 cm and visually normal.  The ileocecal valve was well preserved. The visualized colonic mucosa from the cecum to the anal verge was normal with an intact vascular pattern. Random colon biopsies taken. Small internal hemorrhoids noted. The esophagus was normal.  The GE junction and diaphragmatic impression were at 40 cm, no hiatal hernia. The stomach distended appropriately with insufflated air.   The mucosa of the stomach including cardia, fundus, gastric body and antrum were normal.  The duodenal bulb and post bulbar regions were normal.    Estimated blood loss-insignificant  Specimens-colon biopsies    Impression:  - internal hemorrhoids   - normal EGD    Recommendations:  - Post procedure instructions given  - Follow up on biopsies of colon  - Symptomatic treatment of hemorrhoids            Shanta Liu MD  3/14/2022  1:47 PM

## 2022-03-14 NOTE — ANESTHESIA POSTPROCEDURE EVALUATION
Patient: Ruth Other    Procedure Summary     Date: 03/14/22 Room / Location: [facility] ENDOSCOPY 01 / [facility] ENDO    Anesthesia Start: 3876 Anesthesia Stop: 1680    Procedures:       COLONOSCOPY (N/A )      ESOPHAGOGASTRODUODENOSCOPY (N/A ) Diagnosis:       Abdominal pain, unspecified abdominal location      Weight loss      Change in bowel habits      (Hemorrhoids, normal EGD)    Surgeons: Latha Hood MD Anesthesiologist:     Anesthesia Type: MAC ASA Status: 1          Anesthesia Type: MAC    Vitals Value Taken Time   BP 99/66 03/14/22 1348   Temp  03/14/22 1352   Pulse 82 03/14/22 1351   Resp 18 03/14/22 1351   SpO2 100 % 03/14/22 1351   Vitals shown include unvalidated device data.     [address] [facility] AN Post Evaluation:   Patient Evaluated in PACU  Patient Participation: complete - patient participated  Level of Consciousness: awake  Pain Management: adequate  Airway Patency:patent  Dental exam unchanged from preop  Yes    Cardiovascular Status: acceptable  Respiratory Status: acceptable  Postoperative Hydration acceptable      AUGUST CROWLEY DO  3/14/2022 1:52 PM

## 2022-03-26 NOTE — PAT NURSING NOTE
Email sent to [address] [place] [address]  In regard to pts. Allergy to Brevital and to forward to Dr. Melo Cheng Anesthesia.

## 2022-03-30 NOTE — PAT NURSING NOTE
Pre-op call to F/U on positive COVID test 1/22. Pt said was done for colonoscopy, no symptoms before, but the day after testing developed stuffy nose, lasted a few days. No re current or new COVID symptoms, no known contact w/COVID + person or COVID test in past 10 days, no international travel in past month.

## 2022-03-30 NOTE — TELEPHONE ENCOUNTER
Left detailed message on patient's VM to call back regarding her surgery with Dr. Edward Alanis on 4/5/2022.

## 2022-03-31 NOTE — TELEPHONE ENCOUNTER
Patient states she is well, just the usual gallbladder symptoms, no increase, just the same since January. Colonoscopy done, normal results. Scheduled for surgery on April 5, 2022.

## 2022-04-05 NOTE — INTERVAL H&P NOTE
Pre-op Diagnosis: Chronic cholecystitis [K81.1]  Gallbladder polyp [K82.4]    The above referenced H&P was reviewed by Anjel Matthew MD on 4/5/2022, the patient was examined and no significant changes have occurred in the patient's condition since the H&P was performed. I discussed with the patient and/or legal representative the potential benefits, risks and side effects of this procedure; the likelihood of the patient achieving goals; and potential problems that might occur during recuperation. I discussed reasonable alternatives to the procedure, including risks, benefits and side effects related to the alternatives and risks related to not receiving this procedure. We will proceed with procedure as planned.

## 2022-04-05 NOTE — ANESTHESIA POSTPROCEDURE EVALUATION
Patient: Coletta Peel    Procedure Summary     Date: 04/05/22 Room / Location: 300 [facility] MAIN OR [address] [place] [address] MAIN OR    Anesthesia Start: 1136 Anesthesia Stop: 9508    Procedure: LAPAROSCOPIC CHOLECYSTECTOMY (N/A Abdomen) Diagnosis:       Chronic cholecystitis      Gallbladder polyp      (Chronic cholecystitis [K81.1])      (Gallbladder polyp [K82.4])    Surgeons: Eleuterio Mcneal MD Anesthesiologist: Penni Cheadle, MD    Anesthesia Type: general ASA Status: 1          Anesthesia Type: general    Vitals Value Taken Time   /73 04/05/22 1053   Temp 97.3 04/05/22 1053   Pulse 90 04/05/22 1053   Resp 14 04/05/22 1053   SpO2 98 04/05/22 1053       [address] [place] [address] AN Post Evaluation:   Patient Evaluated in PACU  Patient Participation: complete - patient participated  Level of Consciousness: awake and alert  Pain Management: adequate  Airway Patency:patent  Yes    Cardiovascular Status: acceptable  Respiratory Status: acceptable  Postoperative Hydration acceptable      Ad Monge MD  4/5/2022 10:53 AM

## 2022-04-05 NOTE — OPERATIVE REPORT
[facility]    PATIENT'S NAME: Abby BOURGEOIS   ATTENDING PHYSICIAN: Laura Olmos MD   OPERATING PHYSICIAN: Laura Olmos MD   PATIENT ACCOUNT#:   [id number]    LOCATION:  [place] [address]  MEDICAL RECORD #:   [id number]       YOB: 2000  ADMISSION DATE:       04/05/2022      OPERATION DATE:  04/05/2022    OPERATIVE REPORT      PREOPERATIVE DIAGNOSIS:  Chronic cholecystitis. POSTOPERATIVE DIAGNOSIS:  Chronic cholecystitis. PROCEDURE:  Laparoscopic cholecystectomy. ASSISTANT:  SHANTANU Davila     COMPLICATIONS:  None. ESTIMATED BLOOD LOSS:  5 mL. PATHOLOGY SPECIMEN:  Gallbladder. INDICATIONS:  This 23-year-old woman has been evaluated for postprandial right upper quadrant abdominal pain for some time. An ultrasound was notable for a gallstone and possible gallbladder polyps. She has had a fairly extensive GI workup including upper and lower endoscopies recently which were unremarkable. She had gynecologic followup as well. Cholecystectomy is indicated in hopes of relieving her symptoms and avoiding the complications from cystic duct or common bile duct obstruction. OPERATIVE TECHNIQUE:  With the patient in supine position, a general anesthetic was induced. The abdomen was prepped and draped in the usual aseptic fashion. A transumbilical skin incision was made and the Veress needle inserted. Its intra-abdominal position was confirmed and CO2 insufflation performed. The Veress needle was replaced with an 11 mm trocar. Eventually, a 5 mm trocar was placed in the epigastric position and two 2 mm percutaneous graspers advanced in right subcostal positions in the routine fashion under direct laparoscopic vision. All trocar sites were infiltrated with Marcaine. Brief examination of the pelvis was fairly unremarkable. She had a peritubal cyst, a follicular cyst on the right ovary, and another smaller cyst on the left ovary.   I did not see any evidence for endometriosis. The liver and stomach appeared normal.  There was a drape of adhesions between the gallbladder and omentum and duodenum, suggestive of chronic cholecystitis. Several cystic duct lymph nodes were enlarged and left on the specimen side. Dissection began by first  the adhesions between the gallbladder and omentum and duodenum. Dissection continued into the triangle of Calot, where the cystic duct was isolated. Its junction with the gallbladder was clearly delineated, and this structure was divided between clips. The cystic artery and lymphatic vessel were also divided between clips, and the gallbladder then taken down from the liver in a retrograde fashion using electrocautery for hemostasis. A small amount of bile spillage was aspirated. The gallbladder was placed into a specimen bag, removed through the umbilical port, and sent to Pathology. The right upper quadrant was irrigated and adequate hemostasis noted. Fascial closure needle was used to place 0 Vicryl suture at the umbilical trocar site. Skin edges were closed with 4-0 Vicryl suture. Steri-Strips, gauze, and Tegaderm dressings were applied. Overall, the patient tolerated the procedure well. She was extubated and taken to the recovery room in stable condition.      Dictated By Jessica Ortega MD  d: 04/05/2022 10:51:38  t: 04/05/2022 18:35:34  Job [id number]  [facility]/

## 2022-04-05 NOTE — BRIEF OP NOTE
Pre-Operative Diagnosis: Chronic cholecystitis [K81.1]  Gallbladder polyp [K82.4]     Post-Operative Diagnosis: * No post-op diagnosis entered *      Procedure Performed:   LAPAROSCOPIC CHOLECYSTECTOMY    Surgeon(s) and Role:     Kash Dent MD - Primary    Assistant(s):  Surgical Assistant.: Héctor Hirsch     Surgical Findings: same     Specimen: GB     Estimated Blood Loss: No data recorded    Dictation Number:  [id number]    Anjel Matthew MD  4/5/2022  10:46 AM

## 2022-04-05 NOTE — ANESTHESIA PROCEDURE NOTES
Airway  Date/Time: 4/5/2022 9:39 AM  Urgency: elective      General Information and Staff    Patient location during procedure: OR  Anesthesiologist: Erich Solorio MD  Performed: anesthesiologist     Indications and Patient Condition  Indications for airway management: anesthesia  Sedation level: deep  Preoxygenated: yes  Patient position: sniffing  Mask difficulty assessment: 1 - vent by mask    Final Airway Details  Final airway type: endotracheal airway      Successful airway: ETT  Cuffed: yes   Successful intubation technique: direct laryngoscopy  Facilitating devices/methods: intubating stylet  Endotracheal tube insertion site: oral  Blade: Hinton  Blade size: #2  ETT size (mm): 7.0    Cormack-Lehane Classification: grade I - full view of glottis  Placement verified by: chest auscultation and capnometry   Measured from: lips  ETT to lips (cm): 21  Number of attempts at approach: 1    Additional Comments  Atx, +bite block

## 2022-04-19 NOTE — PROGRESS NOTES
Postoperative Patient Follow-up      4/18/2022    Mavis Brown 20year old      HPI  Patient presents with:  Post-Op: S/P Laparoscopic Cholecystectomy 4/5/2022. Patient states she is still bloated , no pain. States she feels well. Bowels are normal and regular, appetite is good. Denies fevers. Slowly improving, rash under tegaderms has resolved. Exam  Abd soft and nt, nd, incisions clean and dry. Assessment/Plan  Assessment   Chronic cholecystitis  (primary encounter diagnosis)    Good progress following lap choley. Limited activity for 3-4 weeks postop, diet as able, routine medical and gyne and GI follow up.          Domenica Perez MD

## 2022-05-12 NOTE — TELEPHONE ENCOUNTER
Dr Edy Stock: patient has appt with Dr Theresa Ortega 5/16/22 and will need another referral; f/u back pain.

## 2022-05-16 PROBLEM — M54.2 NECK PAIN: Status: ACTIVE | Noted: 2022-05-16

## 2022-05-16 NOTE — PATIENT INSTRUCTIONS
Plan  She will get back into the PT for the cervical and lumbar spines. She will follow up in 3 months or sooner if needed. She will continue with the Tumeric. The patient does not need any injections at this time.

## 2022-05-16 NOTE — TELEPHONE ENCOUNTER
Submitted request to [facility] for Physical Therapy CPT [phone]     Status: pending [facility] clinical review

## 2022-05-26 NOTE — TELEPHONE ENCOUNTER
PT Approved by [facility] valid 5/16/22-8/22/22  Per [facility], will only Approved one PT referral at a time  Subsequent PT referral closed    Patient is already scheduled

## 2022-08-15 NOTE — TELEPHONE ENCOUNTER
Physical Therapy CPT Codes: [id number] ,[id number]  Referral # [id number]    Status: pending approval     Clinical notes sent to [facility] for review.

## 2022-08-15 NOTE — PROGRESS NOTES
8/15/2022    Patient Name: Cyndie Pugh  YOB: 2000          MRN number:  [id number]  Referring Physician:  Therese Hloley Summary    Dx:       Lumbar radiculopathy (M54.16)  Lumbar herniated disc (M51.26)  Lumbar disc disease (M51.9)  Shoulder weakness (R29.898)  Thoracic disc disease (M51.9)  Cervical disc disease (M50.90)       Authorized # of Visits:  12 visits per HMO         Next MD visit: none   Fall Risk: standard         Precautions:  Fall risk  Medication Changes since last visit?: No  Subjective:  Reports she was sore in her neck this morning. Did adjust her Pilates routine and felt better after the class. Worked all day on Saturday. Sleeps on her side and wakes up in pain. Overall feels like her neck is getting better but still has some pain in the morning. Pain Ratin-3/10 VAS    Objective:     ROM:     Trunk         Pain (+/-)   Flexion Fingertips to floor                     Extension Limited 75%    R Sidebend WFL    L Sidebend WFL    R Rotation NT    L Rotation NT    R Sideglide WFL    L Sideglide WFL      Repeated Motion Testing:  NT    CARYN Testing R L   Cervical Axial Rotation NT NT   Apical Expansion decresed decreased   Adduction Drop Test - +   Extension Drop Test NT NT   SLR Long - >90 Long >90   Trunk Rotation [facility] [place]   Posterior Mediastinum Expansion Test decreased decresed   Standing Reach Test - -   Elevated and ER Anterior Rib NT NT   PART + -   PADT - -   Hrusta Adduction Lift Test 3 3   Hrusta Abduction Lift Test NT NT       STRENGTH:   5/5 MMT Scale   Left  Right Comments   Hip Flexion (L2) 5   5    Knee Extension (L3) 5 5    Knee Flexion 5 5    Ankle DF (L4) 5 5    EHL (L5) 5 5    Ankle PF (S1) 5 5    Hip Abduction NT NT    Hip Extension NT NT         2022  Visit #8 2022  Visit #9 8/15/2022'  Visit #10   Manual Therapy Brachial Chain Manual Techniques  1. L AIC  2. Sternal Technique  3.  R superior T4  4. R subclavious  5.   R intercostal release  6. L pec major      MET for R rotation    MET/mob R first rib    Prone unilateral PA's R & L Brachial Chain Manual Techniques  2. Sternal Technique  3.  R superior T4  4. R subclavious  5.  R intercostal release           Brachial Chain Manual Techniques  2. Sternal Technique  3.  R superior T4  4. R subclavious  5.  R intercostal release   Therapeutic Exercise   Sleeping position - uses of Jerrica  Neck roll, angle pillow 45 degrees   Therapeutic Activity      Neuromuscular Education Alternating triceps extension with yellow Tband    Alternating serratus with breathing with swiss ball support Pilates   1.  100's  2. Hip circles  3. Knee to chest  4. SLR with Pilates positioning  3. Use of reformer      TNE Education      HEP Alternating triceps extension with yellow Tband    Alternating serratus with breathing with swiss ball support         Assessment:  Talya Dawson has completed 10 visits of PT and has progressed well. She reports less LBP and intermittent cervical spine pain. She gets more pain in the morning (sleeping position has been discussed) and after working as a .  She is now wearing more supportive shoes, attending Pilate's classes several times per week and working on her HEP. She continue to display a flattened thoracic spine with prominence of the CT junction. She also displays weakness of the serratus anterior, (L weaker than the R) and decreased posterior mediastinum expansion. She will benefit from continued skilled PT to address her postural compensation patterns and continue to improve her function, decrease her pain. Recommend continued skilled PT 1 time per week for up to 8 visits. The pt is in agreement with the POC. Goals: The pt was educated on the plan of care, purpose and individual goals for therapy, precautions for therapy. All questions were answered. 1.  The pt will be independent in their HEP. MET 8/15/2022  2. Centralization of symptoms to the lumbar spine. MET 8/15/2022  3. The pt will be independent in a modified workout program.  MET 8/15/2022  4. The pt will be able to complete a full day as a  without an increase in pain. PROGRESSING 8/15/2022  5. The pt will report a 50% decrease in cervical spine pain. ADDED 8/15/2022    Frequency / Duration: Patient will be seen for 1-2 x/week or a total of 8 visits over a 90 day period. Treatment will include: Manual Therapy; Therapeutic Exercises; Neuromuscular Re-education; Therapeutic Activity; Patient education: Home exercise program instruction; TNE Education; Modalities as needed. Education or treatment limitation: None  Rehab Potential good    Certification From: 7/93/5962  To:8/22/2022      Charges: Smith Miner    Total Timed Treatment: 45 min  Total Treatment Time: 45 min      Patient was advised of these findings, precautions, and treatment options and has agreed to actively participate in planning and for this course of care. Thank you for your referral. Please co-sign or sign and return this letter via fax as soon as possible to [phone]. If you have any questions, please contact me at Dept: [phone]. Sincerely,  Sayra Koenig PT    Electronically signed by therapist: Sayra Koenig PT    [de-identified] certification required: Yes  I certify the need for these services furnished under this plan of treatment and while under my care.     X___________________________________________________ Date____________________    Certification From: 3/68/6058  To:11/16/2022

## 2022-08-18 NOTE — PROGRESS NOTES
2022    Patient Name: Kourtney Olmedo  YOB: 2000          MRN number:  [id number]  Referring Physician:  Maite Vera      Dx:       Lumbar radiculopathy (M54.16)  Lumbar herniated disc (M51.26)  Lumbar disc disease (M51.9)  Shoulder weakness (R29.898)  Thoracic disc disease (M51.9)  Cervical disc disease (M50.90)       Authorized # of Visits:  12 visits per HMO         Next MD visit: none   Fall Risk: standard         Precautions:  Fall risk  Medication Changes since last visit?: No  Subjective:  Reports he saw Dr. Robles Maguire who was happy with her LE strength. Wants to try taping for her shoulders to help her posture while working. Reports she angled her pillow and isn't waking up with sharp pain. Did Pilate's this morning. Pain Ratin-3/10 VAS    Objective:     ROM:     Trunk         Pain (+/-)   Flexion Fingertips to floor                     Extension Limited 75%    R Sidebend WFL    L Sidebend WFL    R Rotation NT    L Rotation NT    R Sideglide WFL    L Sideglide WFL      Repeated Motion Testing:  NT    CARYN Testing R L   Cervical Axial Rotation NT NT   Apical Expansion decresed decreased   Adduction Drop Test - +   Extension Drop Test NT NT   SLR Long - >90 Long >90   Trunk Rotation [facility] [place]   Posterior Mediastinum Expansion Test decreased decresed   Standing Reach Test - -   Elevated and ER Anterior Rib NT NT   PART + -   PADT - -   Hrusta Adduction Lift Test 3 3   Hrusta Abduction Lift Test NT NT       STRENGTH:   5/5 MMT Scale   Left  Right Comments   Hip Flexion (L2) 5   5    Knee Extension (L3) 5 5    Knee Flexion 5 5    Ankle DF (L4) 5 5    EHL (L5) 5 5    Ankle PF (S1) 5 5    Hip Abduction NT NT    Hip Extension NT NT         2022  Visit #8 2022  Visit #9 8/15/2022'  Visit #10 2022  Visit #11   Manual Therapy Brachial Chain Manual Techniques  1. L AIC  2. Sternal Technique  3.  R superior T4  4. R subclavious  5.  R intercostal release  6.   L pec major MET for R rotation    MET/mob R first rib    Prone unilateral PA's R & L Brachial Chain Manual Techniques  2. Sternal Technique  3.  R superior T4  4. R subclavious  5.  R intercostal release           Brachial Chain Manual Techniques  2. Sternal Technique  3.  R superior T4  4. R subclavious  5.  R intercostal release Brachial Chain Manual Techniques  1. L AIC  2. Sternal Technique  3.  R superior T4  4. R subclavious  5.  R intercostal release  6. L pec major           Therapeutic Exercise   Sleeping position - uses of Jerrica  Neck roll, angle pillow 45 degrees    Therapeutic Activity       Neuromuscular Education Alternating triceps extension with yellow Tband    Alternating serratus with breathing with swiss ball support Pilates   1.  100's  2. Hip circles  3. Knee to chest  4. SLR with Pilates positioning  3. Use of reformer    Modified all 4 belly lift    Serratus punch with L hamstring L    UT inhibition taping   TNE Education       HEP Alternating triceps extension with yellow Tband    Alternating serratus with breathing with swiss ball support   Modified all 4 belly lift    Serratus punch with L hamstring L         Assessment:  No adverse effects to treatment. Taped for UT inhibition taping to prevent overuse of the UT's during activities. The pt was able to fine and feel the L serratus anterior in quadraped and with a L arm reach by the end of the session. Given taping precautions. Goals: The pt was educated on the plan of care, purpose and individual goals for therapy, precautions for therapy. All questions were answered. 1.  The pt will be independent in their HEP. MET 8/15/2022  2. Centralization of symptoms to the lumbar spine. MET 8/15/2022  3. The pt will be independent in a modified workout program.  MET 8/15/2022  4. The pt will be able to complete a full day as a  without an increase in pain. PROGRESSING 8/15/2022  5.   The pt will report a 50% decrease in cervical spine pain. ADDED 8/15/2022    Frequency / Duration: Patient will be seen for 1-2 x/week or a total of 8 visits over a 90 day period. Treatment will include: Manual Therapy; Therapeutic Exercises; Neuromuscular Re-education; Therapeutic Activity; Patient education: Home exercise program instruction; TNE Education; Modalities as needed. Education or treatment limitation: None  Rehab Potential good    Certification From: 4/31/3733  To:8/22/2022      Charges: Man1, NM2   Total Timed Treatment: 45 min  Total Treatment Time: 45 min      Patient was advised of these findings, precautions, and treatment options and has agreed to actively participate in planning and for this course of care. Thank you for your referral. Please co-sign or sign and return this letter via fax as soon as possible to [phone]. If you have any questions, please contact me at Dept: [phone]. Sincerely,  Vicente Wesley PT    Electronically signed by therapist: Vicente Wesley PT    [de-identified] certification required: Yes  I certify the need for these services furnished under this plan of treatment and while under my care.     X___________________________________________________ Date____________________    Certification From: 2/94/9215  To:11/16/2022

## 2022-08-22 NOTE — TELEPHONE ENCOUNTER
Physical Therapy    Status: Approved     Authorization #: [id number]    Valid: 08/15/22-12/31/22    Sessions: 12     Patient currently in PT

## 2022-08-26 NOTE — PROGRESS NOTES
2022    Patient Name: Meagan Lance  YOB: 2000          MRN number:  [id number]  Referring Physician:  Paula Mitchell      Dx:       Lumbar radiculopathy (M54.16)  Lumbar herniated disc (M51.26)  Lumbar disc disease (M51.9)  Shoulder weakness (R29.898)  Thoracic disc disease (M51.9)  Cervical disc disease (M50.90)       Authorized # of Visits:  12 visits per HMO         Next MD visit: none   Fall Risk: standard         Precautions:  Fall risk  Medication Changes since last visit?: No  Subjective:  Reports she is doing well. Reports she is doing Pilates 4-5 days per week. Did tweak her R hip when she was doing Pilates. Neck is better but still sore on the R side. Wants to be taped tighter because she felt like it helped. Pain Ratin-3/10 VAS    Objective:     ROM:     Trunk         Pain (+/-)   Flexion Fingertips to floor                     Extension Limited 75%    R Sidebend WFL    L Sidebend WFL    R Rotation NT    L Rotation NT    R Sideglide WFL    L Sideglide WFL      Repeated Motion Testing:  NT    CARYN Testing R L   Cervical Axial Rotation NT NT   Apical Expansion decresed decreased   Adduction Drop Test - +   Extension Drop Test NT NT   SLR Long - >90 Long >90   Trunk Rotation [facility] [place]   Posterior Mediastinum Expansion Test decreased decresed   Standing Reach Test - -   Elevated and ER Anterior Rib NT NT   PART + -   PADT - -   Hrusta Adduction Lift Test 3 3   Hrusta Abduction Lift Test NT NT       STRENGTH:   5/5 MMT Scale   Left  Right Comments   Hip Flexion (L2) 5   5    Knee Extension (L3) 5 5    Knee Flexion 5 5    Ankle DF (L4) 5 5    EHL (L5) 5 5    Ankle PF (S1) 5 5    Hip Abduction NT NT    Hip Extension NT NT         8/15/2022'  Visit #10 2022  Visit #11 2022  Visit #12   Manual Therapy Brachial Chain Manual Techniques  2. Sternal Technique  3.  R superior T4  4. R subclavious  5.  R intercostal release Brachial Chain Manual Techniques  1. L AIC  2. Sternal Technique  3.  R superior T4  4. R subclavious  5.  R intercostal release  6. L pec major         Brachial Chain Manual Techniques  1. L AIC  2. Sternal Technique  3.  R superior T4  4. R subclavious  5.  R intercostal release  6. L pec major     CT junction mobs    R prone leg pull    R anterior to posterior ilium mobs   Therapeutic Exercise Sleeping position - uses of Jerrica  Neck roll, angle pillow 45 degrees     Therapeutic Activity      Neuromuscular Education  Modified all 4 belly lift    Serratus punch with L hamstring L    UT inhibition taping UT inhibition taping R   TNE Education      HEP  Modified all 4 belly lift    Serratus punch with L hamstring L          Assessment:  No adverse effects to treatment. Increased tension put on the kinseotape per the patient's request. Realigned pelvic after increased pain after Pilates class this week. Verbally reviewed HEP. The pt reported pain relief from the session. Goals: The pt was educated on the plan of care, purpose and individual goals for therapy, precautions for therapy. All questions were answered. 1.  The pt will be independent in their HEP. MET 8/15/2022  2. Centralization of symptoms to the lumbar spine. MET 8/15/2022  3. The pt will be independent in a modified workout program.  MET 8/15/2022  4. The pt will be able to complete a full day as a  without an increase in pain. PROGRESSING 8/15/2022  5. The pt will report a 50% decrease in cervical spine pain. ADDED 8/15/2022    Frequency / Duration: Patient will be seen for 1-2 x/week or a total of 8 visits over a 90 day period. Treatment will include: Manual Therapy; Therapeutic Exercises; Neuromuscular Re-education; Therapeutic Activity; Patient education: Home exercise program instruction; TNE Education; Modalities as needed.     Education or treatment limitation: None  Rehab Potential good    Certification From: 8/09/8378  To:8/22/2022      Charges: Man2, NM1 Total Timed Treatment: 45 min  Total Treatment Time: 45 min      Patient was advised of these findings, precautions, and treatment options and has agreed to actively participate in planning and for this course of care. Thank you for your referral. Please co-sign or sign and return this letter via fax as soon as possible to [phone]. If you have any questions, please contact me at Dept: [phone]. Sincerely,  Justin Naik PT    Electronically signed by therapist: Justin Naik PT    [de-identified] certification required: Yes  I certify the need for these services furnished under this plan of treatment and while under my care.     X___________________________________________________ Date____________________    Certification From: 8/99/4756  To:11/16/2022

## 2022-09-07 NOTE — PROGRESS NOTES
2022    Patient Name: Cally Mendiola  YOB: 2000          MRN number:  [id number]  Referring Physician:  Judith Diaz      Dx:       Lumbar radiculopathy (M54.16)  Lumbar herniated disc (M51.26)  Lumbar disc disease (M51.9)  Shoulder weakness (R29.898)  Thoracic disc disease (M51.9)  Cervical disc disease (M50.90)       Authorized # of Visits:  12 visits per HMO         Next MD visit: none   Fall Risk: standard         Precautions:  Fall risk  Medication Changes since last visit?: No  Subjective:  Reports she is feeling pretty good. States she is no longer is in constant pain. States she feels more stability and balance during Pilates workouts. States taping her shoulders tighter was helpful. Pain Ratin-3/10 VAS    Objective:     ROM:     Trunk         Pain (+/-)   Flexion Fingertips to floor                     Extension Limited 75%    R Sidebend WFL    L Sidebend WFL    R Rotation NT    L Rotation NT    R Sideglide WFL    L Sideglide WFL      Repeated Motion Testing:  NT    CARYN Testing R L   Cervical Axial Rotation NT NT   Apical Expansion decresed decreased   Adduction Drop Test - +   Extension Drop Test NT NT   SLR Long - >90 Long >90   Trunk Rotation [facility] [place]   Posterior Mediastinum Expansion Test decreased decresed   Standing Reach Test - -   Elevated and ER Anterior Rib NT NT   PART + -   PADT - -   Hrusta Adduction Lift Test 3 3   Hrusta Abduction Lift Test NT NT       STRENGTH:   5/5 MMT Scale   Left  Right Comments   Hip Flexion (L2) 5   5    Knee Extension (L3) 5 5    Knee Flexion 5 5    Ankle DF (L4) 5 5    EHL (L5) 5 5    Ankle PF (S1) 5 5    Hip Abduction NT NT    Hip Extension NT NT         8/15/2022'  Visit #10 2022  Visit #11 2022  Visit #12 2022  Visit #13   Manual Therapy Brachial Chain Manual Techniques  2. Sternal Technique  3.  R superior T4  4. R subclavious  5.  R intercostal release Brachial Chain Manual Techniques  1. L AIC  2.   Sternal Technique  3.  R superior T4  4. R subclavious  5.  R intercostal release  6. L pec major         Brachial Chain Manual Techniques  1. L AIC  2. Sternal Technique  3.  R superior T4  4. R subclavious  5.  R intercostal release  6. L pec major     CT junction mobs    R prone leg pull    R anterior to posterior ilium mobs Brachial Chain Manual Techniques  1. L AIC  2. Sternal Technique  3.  R superior T4  4. R subclavious  5.  R intercostal release  6. L pec major     CT junction mobs    L to R side glides Cspine    Manual traction    STM bilateral cervical parapsinals   Therapeutic Exercise Sleeping position - uses of Jerrica  Neck roll, angle pillow 45 degrees      Therapeutic Activity       Neuromuscular Education  Modified all 4 belly lift    Serratus punch with L hamstring L    UT inhibition taping UT inhibition taping R All 4 resisted R glut max    Standing R glut max against the wall    Review of Pilates techniques using R glut max   TNE Education       HEP  Modified all 4 belly lift    Serratus punch with L hamstring L           Assessment:  No adverse effects to treatment. Reviewed Pilate's techniques to improve use of the R glut max to avoid R hip flexor pain and therefore added resisted R glut max activities to the patient's program.  Brachial Chain now neutral and less neck and LBP overall. Continues to use shoulder taping to avoid shoulder hiking R while working. The pt is very compliant with her HEP. Goals: The pt was educated on the plan of care, purpose and individual goals for therapy, precautions for therapy. All questions were answered. 1.  The pt will be independent in their HEP. MET 8/15/2022  2. Centralization of symptoms to the lumbar spine. MET 8/15/2022  3. The pt will be independent in a modified workout program.  MET 8/15/2022  4. The pt will be able to complete a full day as a  without an increase in pain. PROGRESSING 8/15/2022  5.   The pt will report a 50% decrease in cervical spine pain. ADDED 8/15/2022    Frequency / Duration: Patient will be seen for 1-2 x/week or a total of 8 visits over a 90 day period. Treatment will include: Manual Therapy; Therapeutic Exercises; Neuromuscular Re-education; Therapeutic Activity; Patient education: Home exercise program instruction; TNE Education; Modalities as needed. Education or treatment limitation: None  Rehab Potential good    Certification From: 5/83/0134  To:8/22/2022      Charges: Man2, NM1 Total Timed Treatment: 45 min  Total Treatment Time: 45 min      Patient was advised of these findings, precautions, and treatment options and has agreed to actively participate in planning and for this course of care. Thank you for your referral. Please co-sign or sign and return this letter via fax as soon as possible to [phone]. If you have any questions, please contact me at Dept: [phone]. Sincerely,  Tawnya Benitez PT    Electronically signed by therapist: Tawnya Benitez PT    [de-identified] certification required: Yes  I certify the need for these services furnished under this plan of treatment and while under my care.     X___________________________________________________ Date____________________    Certification From: 7/99/3562  To:11/16/2022

## 2022-09-13 NOTE — TELEPHONE ENCOUNTER
The pt states she did not receive a text to confirm her appointment and therefore forgot she had an appointment this morning.   Rescheduled for Thursday at 7:15 am.

## 2022-09-15 NOTE — PROGRESS NOTES
9/15/2022    Patient Name: Alyssa Shea  YOB: 2000          MRN number:  [id number]  Referring Physician:  Igor Munoz      Dx:       Lumbar radiculopathy (M54.16)  Lumbar herniated disc (M51.26)  Lumbar disc disease (M51.9)  Shoulder weakness (R29.898)  Thoracic disc disease (M51.9)  Cervical disc disease (M50.90)       Authorized # of Visits:  12 visits per O         Next MD visit: none   Fall Risk: standard         Precautions:  Fall risk  Medication Changes since last visit?: No  Subjective:  Reports she is feeling pretty good. LB has been pinching on the R side. Unsure what she did. Reports she slept on a different bed and states she thinks that made her sore. Pain Ratin-3/10 VAS    Objective:     ROM:     Trunk         Pain (+/-)   Flexion Fingertips to floor                     Extension Limited 75%    R Sidebend WFL    L Sidebend WFL    R Rotation NT    L Rotation NT    R Sideglide WFL    L Sideglide WFL      Repeated Motion Testing:  NT    CARYN Testing R L   Cervical Axial Rotation NT NT   Apical Expansion decresed decreased   Adduction Drop Test - +   Extension Drop Test NT NT   SLR Long - >90 Long >90   Trunk Rotation [facility] [place]   Posterior Mediastinum Expansion Test decreased decresed   Standing Reach Test - -   Elevated and ER Anterior Rib NT NT   PART + -   PADT - -   Hrusta Adduction Lift Test 3 3   Hrusta Abduction Lift Test NT NT       STRENGTH:   5/5 MMT Scale   Left  Right Comments   Hip Flexion (L2) 5   5    Knee Extension (L3) 5 5    Knee Flexion 5 5    Ankle DF (L4) 5 5    EHL (L5) 5 5    Ankle PF (S1) 5 5    Hip Abduction NT NT    Hip Extension NT NT         2022  Visit #12 2022  Visit #13 9/15/2022  Visit #14   Manual Therapy Brachial Chain Manual Techniques  1. L AIC  2. Sternal Technique  3.  R superior T4  4. R subclavious  5.  R intercostal release  6.   L pec major     CT junction mobs    R prone leg pull    R anterior to posterior ilium mobs Brachial Chain Manual Techniques  1. L AIC  2. Sternal Technique  3.  R superior T4  4. R subclavious  5.  R intercostal release  6. L pec major     CT junction mobs    L to R side glides Cspine    Manual traction    STM bilateral cervical parapsinals R prone leg pull    Pelvic isometrics in supine hooklying    R anterior to posterior ilium mob    STM cervical paraspinals    CT junction mobs       Therapeutic Exercise      Therapeutic Activity      Neuromuscular Education UT inhibition taping R All 4 resisted R glut max    Standing R glut max against the wall    Review of Pilates techniques using R glut max Standing R glut max against the wall    Standing R glut max against the wall   TNE Education      HEP   Standing R glut max against the wall    Standing R glut max against the wall       Assessment:  No adverse effects to treatment. The pt reported decreased pinching after manual therapy to correct the R forward innominate. Focused on R glut max activities to help keep R LE alignment neutral.  The pt displays less myospasm in the cervical paraspinals and has less pain overall. Goals: The pt was educated on the plan of care, purpose and individual goals for therapy, precautions for therapy. All questions were answered. 1.  The pt will be independent in their HEP. MET 8/15/2022  2. Centralization of symptoms to the lumbar spine. MET 8/15/2022  3. The pt will be independent in a modified workout program.  MET 8/15/2022  4. The pt will be able to complete a full day as a  without an increase in pain. PROGRESSING 8/15/2022  5. The pt will report a 50% decrease in cervical spine pain. ADDED 8/15/2022    Frequency / Duration: Patient will be seen for 1-2 x/week or a total of 8 visits over a 90 day period. Treatment will include: Manual Therapy; Therapeutic Exercises; Neuromuscular Re-education; Therapeutic Activity;   Patient education: Home exercise program instruction; TNE Education; Modalities as needed. Education or treatment limitation: None  Rehab Potential good    Certification From: 7/57/0610  To:8/22/2022      Charges: Man2, NM1 Total Timed Treatment: 45 min  Total Treatment Time: 45 min      Patient was advised of these findings, precautions, and treatment options and has agreed to actively participate in planning and for this course of care. Thank you for your referral. Please co-sign or sign and return this letter via fax as soon as possible to [phone]. If you have any questions, please contact me at Dept: [phone]. Sincerely,  Sherice Kyle PT    Electronically signed by therapist: Sherice Kyle PT    [de-identified] certification required: Yes  I certify the need for these services furnished under this plan of treatment and while under my care.     X___________________________________________________ Date____________________    Certification From: 5/19/1533  To:11/16/2022

## 2022-09-19 NOTE — PROGRESS NOTES
2022    Patient Name: Michaela Dinero  YOB: 2000          MRN number:  [id number]  Referring Physician:  Héctor Flores      Dx:       Lumbar radiculopathy (M54.16)  Lumbar herniated disc (M51.26)  Lumbar disc disease (M51.9)  Shoulder weakness (R29.898)  Thoracic disc disease (M51.9)  Cervical disc disease (M50.90)       Authorized # of Visits:  12 visits per O         Next MD visit: none   Fall Risk: standard         Precautions:  Fall risk  Medication Changes since last visit?: No  Subjective:  Reports she is doing well. Reports she was sore in the LB after the session but than that resolved. States she feels tight across her chest wall and has been trying to work on her posture. Pain Ratin-3/10 VAS    Objective:     ROM:     Trunk         Pain (+/-)   Flexion Fingertips to floor                     Extension Limited 75%    R Sidebend WFL    L Sidebend WFL    R Rotation NT    L Rotation NT    R Sideglide WFL    L Sideglide WFL      Repeated Motion Testing:  NT    CARYN Testing R L   Cervical Axial Rotation NT NT   Apical Expansion decresed decreased   Adduction Drop Test - +   Extension Drop Test NT NT   SLR Long - >90 Long >90   Trunk Rotation [facility] WF   Posterior Mediastinum Expansion Test decreased decresed   Standing Reach Test - -   Elevated and ER Anterior Rib NT NT   PART + -   PADT - -   Hrusta Adduction Lift Test 3 3   Hrusta Abduction Lift Test NT NT       STRENGTH:   5/5 MMT Scale   Left  Right Comments   Hip Flexion (L2) 5   5    Knee Extension (L3) 5 5    Knee Flexion 5 5    Ankle DF (L4) 5 5    EHL (L5) 5 5    Ankle PF (S1) 5 5    Hip Abduction NT NT    Hip Extension NT NT         2022  Visit #12 2022  Visit #13 9/15/2022  Visit #14 2022  Visit #15   Manual Therapy Brachial Chain Manual Techniques  1. L AIC  2. Sternal Technique  3.  R superior T4  4. R subclavious  5.  R intercostal release  6.   L pec major     CT junction mobs    R prone leg pull    R anterior to posterior ilium mobs Brachial Chain Manual Techniques  1. L AIC  2. Sternal Technique  3.  R superior T4  4. R subclavious  5.  R intercostal release  6. L pec major     CT junction mobs    L to R side glides Cspine    Manual traction    STM bilateral cervical parapsinals R prone leg pull    Pelvic isometrics in supine hooklying    R anterior to posterior ilium mob    STM cervical paraspinals    CT junction mobs     Brachial Chain Manual Techniques  1. L AIC  2. Sternal Technique  3.  R superior T4  4. R subclavious  5.  R intercostal release  6. L pec major           Therapeutic Exercise       Therapeutic Activity       Neuromuscular Education UT inhibition taping R All 4 resisted R glut max    Standing R glut max against the wall    Review of Pilates techniques using R glut max Standing R glut max against the wall    Standing R glut max against the wall Child pose with diagonals with breathing    PME expansion in tall sitting with swiss ball    Anterior neck inhibition over swiss ball    R intercostal inhibition over swiss ball (in L side lying)   TNE Education       HEP   Standing R glut max against the wall    Standing R glut max against the wall Child pose with diagonals with breathing    PME expansion in tall sitting with swiss ball    Anterior neck inhibition over swiss ball    R intercostal inhibition over swiss ball (in L side lying)       Assessment:  No adverse effects to treatment. Worked on activities to decrease anterior chest wall tightness that the patient reported when coming to therapy this visit. Updated HEP. Note improved chest wall mobility after manual techniques this date. Overall pain decreasing. Goals: The pt was educated on the plan of care, purpose and individual goals for therapy, precautions for therapy. All questions were answered. 1.  The pt will be independent in their HEP. MET 8/15/2022  2.   Centralization of symptoms to the lumbar spine.  MET 8/15/2022  3. The pt will be independent in a modified workout program.  MET 8/15/2022  4. The pt will be able to complete a full day as a  without an increase in pain. PROGRESSING 8/15/2022  5. The pt will report a 50% decrease in cervical spine pain. ADDED 8/15/2022    Frequency / Duration: Patient will be seen for 1-2 x/week or a total of 8 visits over a 90 day period. Treatment will include: Manual Therapy; Therapeutic Exercises; Neuromuscular Re-education; Therapeutic Activity; Patient education: Home exercise program instruction; TNE Education; Modalities as needed. Education or treatment limitation: None  Rehab Potential good    Certification From: 3/38/6337  To:8/22/2022      Charges: Man1, NM2 Total Timed Treatment: 45 min  Total Treatment Time: 45 min      Patient was advised of these findings, precautions, and treatment options and has agreed to actively participate in planning and for this course of care. Thank you for your referral. Please co-sign or sign and return this letter via fax as soon as possible to [phone]. If you have any questions, please contact me at Dept: [phone]. Sincerely,  Brunilda Hernandez PT    Electronically signed by therapist: Brunilda Hernandez PT    [de-identified] certification required: Yes  I certify the need for these services furnished under this plan of treatment and while under my care.     X___________________________________________________ Date____________________    Certification From: 3/78/1939  To:11/16/2022

## 2022-09-26 NOTE — PROGRESS NOTES
2022    Patient Name: Meagan Lance  YOB: 2000          MRN number:  [id number]  Referring Physician:  Paula Mitchell      Dx:       Lumbar radiculopathy (M54.16)  Lumbar herniated disc (M51.26)  Lumbar disc disease (M51.9)  Shoulder weakness (R29.898)  Thoracic disc disease (M51.9)  Cervical disc disease (M50.90)       Authorized # of Visits:  12 visits per HMO         Next MD visit: none   Fall Risk: standard         Precautions:  Fall risk  Medication Changes since last visit?: No  Subjective:  Reports she is doing well. States she hasn't been in Pilates this week because of scheduling. States she is slightly stiffer from not having Pilates class but her pain has not returned. Has been doing cardio and running hills instead of Pilates this week. Still feels very slight pain in her back R LB is she stands for a long period of time. Reports the MD wanted her to get stronger over the next couple of visits.   Pain Ratin-3/10 VAS    Objective:     ROM:     Trunk         Pain (+/-)   Flexion Fingertips to floor                     Extension Limited 75%    R Sidebend WFL    L Sidebend WFL    R Rotation NT    L Rotation NT    R Sideglide WFL    L Sideglide WFL      Repeated Motion Testing:  NT    CARYN Testing R L   Cervical Axial Rotation NT NT   Apical Expansion decresed decreased   Adduction Drop Test - +   Extension Drop Test NT NT   SLR Long - >90 Long >90   Trunk Rotation [facility] WFL   Posterior Mediastinum Expansion Test decreased decresed   Standing Reach Test - -   Elevated and ER Anterior Rib NT NT   PART + -   PADT - -   Hrusta Adduction Lift Test 3 3   Hrusta Abduction Lift Test NT NT       STRENGTH:   5/5 MMT Scale   Left  Right Comments   Hip Flexion (L2) 5   5    Knee Extension (L3) 5 5    Knee Flexion 5 5    Ankle DF (L4) 5 5    EHL (L5) 5 5    Ankle PF (S1) 5 5    Hip Abduction NT NT    Hip Extension NT NT         9/15/2022  Visit #14 2022  Visit #15 2022  Visit #16 Manual Therapy R prone leg pull    Pelvic isometrics in supine hooklying    R anterior to posterior ilium mob    STM cervical paraspinals    CT junction mobs     Brachial Chain Manual Techniques  1. L AIC  2. Sternal Technique  3.  R superior T4  4. R subclavious  5.  R intercostal release  6. L pec major         CT junction mobs    Mid thoracic mobs   Therapeutic Exercise      Therapeutic Activity      Neuromuscular Education Standing R glut max against the wall    Standing R glut max against the wall Child pose with diagonals with breathing    PME expansion in tall sitting with swiss ball    Anterior neck inhibition over swiss ball    R intercostal inhibition over swiss ball (in L side lying) L serratus punch in sitting with green Tband    Alternating serratus punch in sitting    Bear plank    1/2 plank    Serratus squat against the wall    Alternating triceps extension in standing   TNE Education      HEP Standing R glut max against the wall    Standing R glut max against the wall Child pose with diagonals with breathing    PME expansion in tall sitting with swiss ball    Anterior neck inhibition over swiss ball    R intercostal inhibition over swiss ball (in L side lying) L serratus punch in sitting with green Tband    Alternating serratus punch in sitting    Bear plank    1/2 plank    Serratus squat against the wall    Alternating triceps extension in standing       Assessment:  No adverse effects to treatment. Focused on activities to improve recruitment of the serratus anterior and triceps. Continues to need verbal cues and tactile cues to avoid over use of the UT\"s and cervical paraspinals with activities but was able to correct with cueing. Note R lower scapular winging off her back with flattened thoracic spine. The pt has been very compliant with her HEP and is progressing well towards remaining goals. Goals:       The pt was educated on the plan of care, purpose and individual goals for therapy, precautions for therapy. All questions were answered. 1.  The pt will be independent in their HEP. MET 8/15/2022  2. Centralization of symptoms to the lumbar spine. MET 8/15/2022  3. The pt will be independent in a modified workout program.  MET 8/15/2022  4. The pt will be able to complete a full day as a  without an increase in pain. PROGRESSING 8/15/2022  5. The pt will report a 50% decrease in cervical spine pain. ADDED 8/15/2022    Frequency / Duration: Patient will be seen for 1-2 x/week or a total of 8 visits over a 90 day period. Treatment will include: Manual Therapy; Therapeutic Exercises; Neuromuscular Re-education; Therapeutic Activity; Patient education: Home exercise program instruction; TNE Education; Modalities as needed. Education or treatment limitation: None  Rehab Potential good    Certification From: 5/88/6172  To:8/22/2022      Charges: Michelle Caicedo Total Timed Treatment: 53 min  Total Treatment Time: 53 min      Patient was advised of these findings, precautions, and treatment options and has agreed to actively participate in planning and for this course of care. Thank you for your referral. Please co-sign or sign and return this letter via fax as soon as possible to [phone]. If you have any questions, please contact me at Dept: [phone]. Sincerely,  Florencia Cervantes PT    Electronically signed by therapist: Florencia Cervantes PT    [de-identified] certification required: Yes  I certify the need for these services furnished under this plan of treatment and while under my care.     X___________________________________________________ Date____________________    Certification From: 6/96/6977  To:11/16/2022

## 2022-10-03 NOTE — PROGRESS NOTES
10/3/2022    Patient Name: Chadd Barney  YOB: 2000          MRN number:  [id number]  Referring Physician:  Susie Medina      Dx:       Lumbar radiculopathy (M54.16)  Lumbar herniated disc (M51.26)  Lumbar disc disease (M51.9)  Shoulder weakness (R29.898)  Thoracic disc disease (M51.9)  Cervical disc disease (M50.90)       Authorized # of Visits:  12 visits per HMO         Next MD visit: none   Fall Risk: standard         Precautions:  Fall risk  Medication Changes since last visit?: No  Subjective:  Reports she is doing good overall but still has some neck pain and has some R SIJ pain. States the manual therapy helped but then the pain returned. States she returned to her Pilate's classes. Wants to review the serratus squat.   Pain Ratin/10 VAS    Objective:     ROM:     Trunk         Pain (+/-)   Flexion Fingertips to floor                     Extension Limited 75%    R Sidebend WFL    L Sidebend WFL    R Rotation NT    L Rotation NT    R Sideglide WFL    L Sideglide WFL      Repeated Motion Testing:  NT    CARYN Testing R L   Cervical Axial Rotation NT NT   Apical Expansion decresed decreased   Adduction Drop Test - +   Extension Drop Test NT NT   SLR Long - >90 Long >90   Trunk Rotation [facility] [place]   Posterior Mediastinum Expansion Test decreased decresed   Standing Reach Test - -   Elevated and ER Anterior Rib NT NT   PART + -   PADT - -   Hrusta Adduction Lift Test 3 3   Hrusta Abduction Lift Test NT NT       STRENGTH:   5/5 MMT Scale   Left  Right Comments   Hip Flexion (L2) 5   5    Knee Extension (L3) 5 5    Knee Flexion 5 5    Ankle DF (L4) 5 5    EHL (L5) 5 5    Ankle PF (S1) 5 5    Hip Abduction NT NT    Hip Extension NT NT         9/15/2022  Visit #14 2022  Visit #15 2022  Visit #16 10/3/2022  Visit #17   Manual Therapy R prone leg pull    Pelvic isometrics in supine hooklying    R anterior to posterior ilium mob    STM cervical paraspinals    CT junction mobs     Brachial Chain Manual Techniques  1. L AIC  2. Sternal Technique  3.  R superior T4  4. R subclavious  5.  R intercostal release  6. L pec major         CT junction mobs    Mid thoracic mobs CT junction mobs    Mid thoracic mobs    R prone leg pull    R anterior to posterior ilium mobs    Pelvic isometrics   Therapeutic Exercise       Therapeutic Activity       Neuromuscular Education Standing R glut max against the wall    Standing R glut max against the wall Child pose with diagonals with breathing    PME expansion in tall sitting with swiss ball    Anterior neck inhibition over swiss ball    R intercostal inhibition over swiss ball (in L side lying) L serratus punch in sitting with green Tband    Alternating serratus punch in sitting    Bear plank    1/2 plank    Serratus squat against the wall    Alternating triceps extension in standing Serratus squat against the wall    stomatonathartic squat   TNE Education       HEP Standing R glut max against the wall    Standing R glut max against the wall Child pose with diagonals with breathing    PME expansion in tall sitting with swiss ball    Anterior neck inhibition over swiss ball    R intercostal inhibition over swiss ball (in L side lying) L serratus punch in sitting with green Tband    Alternating serratus punch in sitting    Bear plank    1/2 plank    Serratus squat against the wall    Alternating triceps extension in standing        Assessment:  No adverse effects to treatment. Note decreased mobility at the CT junction this date. Reviewed serratus squat and added stomatonathartic squat to her program to address this continued pain. The pt has been compliant with her HEP. The pt also displayed a R anterior innominate which was corrected with manual therapy. Will continue PT x 1 visit to review HEP and re-check serratus anterior activation and then plan to DC. The pt is in agreement with the POC. Goals:       The pt was educated on the plan of care, purpose and individual goals for therapy, precautions for therapy. All questions were answered. 1.  The pt will be independent in their HEP. MET 8/15/2022  2. Centralization of symptoms to the lumbar spine. MET 8/15/2022  3. The pt will be independent in a modified workout program.  MET 8/15/2022  4. The pt will be able to complete a full day as a  without an increase in pain. PROGRESSING 8/15/2022  5. The pt will report a 50% decrease in cervical spine pain. ADDED 8/15/2022    Frequency / Duration: Patient will be seen for 1-2 x/week or a total of 8 visits over a 90 day period. Treatment will include: Manual Therapy; Therapeutic Exercises; Neuromuscular Re-education; Therapeutic Activity; Patient education: Home exercise program instruction; TNE Education; Modalities as needed. Education or treatment limitation: None  Rehab Potential good    Certification From: 2/81/7622  To:8/22/2022      Charges: Man2, NM1 Total Timed Treatment: 45 min  Total Treatment Time: 45 min      Patient was advised of these findings, precautions, and treatment options and has agreed to actively participate in planning and for this course of care. Thank you for your referral. Please co-sign or sign and return this letter via fax as soon as possible to [phone]. If you have any questions, please contact me at Dept: [phone]. Sincerely,  Eugenia Teran PT    Electronically signed by therapist: Eugenia Teran PT    [de-identified] certification required: Yes  I certify the need for these services furnished under this plan of treatment and while under my care.     X___________________________________________________ Date____________________    Certification From: 6/94/7762  To:11/16/2022

## 2022-10-13 NOTE — PROGRESS NOTES
10/13/2022      Patient Name: Rachel Mireles  YOB: 2000          MRN number:  [id number]  Referring Physician:  Windell Kawasaki      Dx:       Lumbar radiculopathy (M54.16)  Lumbar herniated disc (M51.26)  Lumbar disc disease (M51.9)  Shoulder weakness (R29.898)  Thoracic disc disease (M51.9)  Cervical disc disease (M50.90)       Authorized # of Visits:  12 visits per HMO         Next MD visit: none   Fall Risk: standard         Precautions:  Fall risk  Medication Changes since last visit?: No  Subjective:  Reports she does have pain in the R posterior pelvis. Has been doing Pilates twice a week. Has been doing her elliptical machine at home along with planks. Did get a massage which helped some. Report she got some shooting pain during the massage but that subsided.   Pain Ratin/10 VAS    Objective:     ROM:     Trunk         Pain (+/-)   Flexion Fingertips to floor                     Extension Limited 75%    R Sidebend WFL    L Sidebend WFL    R Rotation NT    L Rotation NT    R Sideglide WFL    L Sideglide WFL      Repeated Motion Testing:  NT    CARYN Testing R L   Cervical Axial Rotation NT NT   Apical Expansion decresed decreased   Adduction Drop Test - +   Extension Drop Test NT NT   SLR Long - >90 Long >90   Trunk Rotation [facility] WF   Posterior Mediastinum Expansion Test decreased decresed   Standing Reach Test - -   Elevated and ER Anterior Rib NT NT   PART + -   PADT - -   Hrusta Adduction Lift Test 3 3   Hrusta Abduction Lift Test NT NT       STRENGTH:   5/5 MMT Scale   Left  Right Comments   Hip Flexion (L2) 5   5    Knee Extension (L3) 5 5    Knee Flexion 5 5    Ankle DF (L4) 5 5    EHL (L5) 5 5    Ankle PF (S1) 5 5    Hip Abduction NT NT    Hip Extension NT NT         2022  Visit #16 10/3/2022  Visit #17 10/13/2022  Visit #18   Manual Therapy CT junction mobs    Mid thoracic mobs CT junction mobs    Mid thoracic mobs    R prone leg pull    R anterior to posterior ilium mobs    Pelvic isometrics    Therapeutic Exercise      Therapeutic Activity      Neuromuscular Education L serratus punch in sitting with green Tband    Alternating serratus punch in sitting    Bear plank    1/2 plank    Serratus squat against the wall    Alternating triceps extension in standing Serratus squat against the wall    stomatonathartic squat stomatonathartic squat    SL integration #47    R rectus femoris inhibition    TNE Education      HEP L serratus punch in sitting with green Tband    Alternating serratus punch in sitting    Bear plank    1/2 plank    Serratus squat against the wall    Alternating triceps extension in standing  SL integration #47    R rectus femoris inhibition        Assessment:  No adverse effects to treatment. The pt displays a neurtral exam except for (+) R PART and 3/5 on the HADLT R. She was able to correct those deficits with the above stated activities. Will follow up in two weeks to she how she is progressing with her HEP. Goals: The pt was educated on the plan of care, purpose and individual goals for therapy, precautions for therapy. All questions were answered. 1.  The pt will be independent in their HEP. MET 8/15/2022  2. Centralization of symptoms to the lumbar spine. MET 8/15/2022  3. The pt will be independent in a modified workout program.  MET 8/15/2022  4. The pt will be able to complete a full day as a  without an increase in pain. PROGRESSING 8/15/2022  5. The pt will report a 50% decrease in cervical spine pain. ADDED 8/15/2022    Frequency / Duration: Patient will be seen for 1-2 x/week or a total of 8 visits over a 90 day period. Treatment will include: Manual Therapy; Therapeutic Exercises; Neuromuscular Re-education; Therapeutic Activity; Patient education: Home exercise program instruction; TNE Education; Modalities as needed.     Education or treatment limitation: None  Rehab Potential good    Certification From: 5/24/2022  To:8/22/2022      Charges: NM3 Total Timed Treatment: 45 min  Total Treatment Time: 45 min      Patient was advised of these findings, precautions, and treatment options and has agreed to actively participate in planning and for this course of care. Thank you for your referral. Please co-sign or sign and return this letter via fax as soon as possible to [phone]. If you have any questions, please contact me at Dept: [phone]. Sincerely,  Fani Atkinson PT    Electronically signed by therapist: Fani Atkinson PT    [de-identified] certification required: Yes  I certify the need for these services furnished under this plan of treatment and while under my care.     X___________________________________________________ Date____________________    Certification From: 0/17/6787  To:11/16/2022

## 2022-11-15 NOTE — PROGRESS NOTES
11/15/2022    Patient Name: Steph Dimas  YOB: 2000          MRN number:  [id number]  Referring Physician:  Duong Caicedo      Dx:       Lumbar radiculopathy (M54.16)  Lumbar herniated disc (M51.26)  Lumbar disc disease (M51.9)  Shoulder weakness (R29.898)  Thoracic disc disease (M51.9)  Cervical disc disease (M50.90)       Authorized # of Visits:  12 visits per HMO         Next MD visit: none   Fall Risk: standard         Precautions:  Fall risk  Medication Changes since last visit?: No  Subjective:  Reports she is doing ok. Reports the R side of her LB has still been pinching. States about 1.5 weeks ago her mid back gave out on her. States she couldn't stand up straight. Did some of her breathing activities and she was able to get upright after about 1 minute. Was achy the rest of the day but then find the next day. Massage later in the week helped. Reports less overuse of the shoulders when working but still gets some R sided LBP and soreness throughout her body by the end of her work day.   Pain Ratin/10 VAS    Objective:     ROM:     Trunk         Pain (+/-)   Flexion Fingertips to floor                     Extension Limited 75%    R Sidebend WFL    L Sidebend WFL    R Rotation NT    L Rotation NT    R Sideglide WFL    L Sideglide WFL      Repeated Motion Testing:  NT    CARYN Testing R L   Cervical Axial Rotation - -   Apical Expansion [facility] WFL   Adduction Drop Test - -   Extension Drop Test NT NT   SLR 90  90   Trunk Rotation [facility] WFL   Posterior Mediastinum Expansion Test [facility]   Standing Reach Test - -   Elevated and ER Anterior Rib NT NT   PART - -   PADT - -   Hrusta Adduction Lift Test 3 3   Hrusta Abduction Lift Test NT NT       STRENGTH:   5/5 MMT Scale   Left  Right Comments   Hip Flexion (L2) 5   5    Knee Extension (L3) 5 5    Knee Flexion 5 5    Ankle DF (L4) 5 5    EHL (L5) 5 5    Ankle PF (S1) 5 5    Hip Abduction NT NT    Hip Extension NT NT         2022  Visit #16 10/3/2022  Visit #17 10/13/2022  Visit #18 11/15/2022  Visit #19   Manual Therapy CT junction mobs    Mid thoracic mobs CT junction mobs    Mid thoracic mobs    R prone leg pull    R anterior to posterior ilium mobs    Pelvic isometrics     Therapeutic Exercise    thoracic extension in sitting    Discussion of gym based program  1. Alternating bicep curls  2. Alternating triceps extension  3. Alternating rows  3. Wall squats  4. Single leg RDL's  4.  lunges   Therapeutic Activity       Neuromuscular Education L serratus punch in sitting with green Tband    Alternating serratus punch in sitting    Bear plank    1/2 plank    Serratus squat against the wall    Alternating triceps extension in standing Serratus squat against the wall    stomatonathartic squat stomatonathartic squat    SL integration #47    R rectus femoris inhibition  SL integration #47    CARYN re-evaluation   HADLT 3/5  (-) ADT bilaterally  (-) PADT bilaterally  (+) PART R - corrected during session       TNE Education       HEP L serratus punch in sitting with green Tband    Alternating serratus punch in sitting    Bear plank    1/2 plank    Serratus squat against the wall    Alternating triceps extension in standing  SL integration #47    R rectus femoris inhibition  thoracic extension in sitting    Discussion of gym based program  1. Alternating bicep curls  2. Alternating triceps extension  3. Alternating rows  3. Wall squats  4. Single leg RDL's  4. Lunges    SL integration #47       Assessment:  Emmy Veras has completed 19 visits and has progressed well. States she has continued her Pilates and recently joined her gym. Wants to improve her endurance and do more of her PT activities. Has been doing her current HEP regularly. Reviwed her full HEP this date and than added activities to improve thoracic extension if she experiences a thoracic kyphosis deformity when waking.   Also reviewed SL integration #47 to improve pelvic abduction raise test and decreased R sided pelvic pain. The pt was instructed in appropriate alternative gym based weight lifting activities and precautions for progression. The pt reported understanding of all instructions and will be discharged at this time unless otherwise advised by the MD.  The pt was advised to contact PT with any questions. Goals: The pt was educated on the plan of care, purpose and individual goals for therapy, precautions for therapy. All questions were answered. 1.  The pt will be independent in their HEP. MET 8/15/2022  2. Centralization of symptoms to the lumbar spine. MET 8/15/2022  3. The pt will be independent in a modified workout program.  MET 8/15/2022  4. The pt will be able to complete a full day as a  without an increase in pain. PROGRESSING 11/15/2022  5. The pt will report a 50% decrease in cervical spine pain. MET 11/15/2022    Plan:      Education or treatment limitation: None  Rehab Potential good    Certification From: 2/54/2299  To:8/22/2022      Charges: NM3 Total Timed Treatment: 45 min  Total Treatment Time: 45 min      Patient was advised of these findings, precautions, and treatment options and has agreed to actively participate in planning and for this course of care. Thank you for your referral. Please co-sign or sign and return this letter via fax as soon as possible to [phone]. If you have any questions, please contact me at Dept: [phone]. Sincerely,  Justin Naik PT    Electronically signed by therapist: Justin Naik PT    [de-identified] certification required: Yes  I certify the need for these services furnished under this plan of treatment and while under my care.     X___________________________________________________ Date____________________    Certification From: 9/53/1869  To:11/16/2022

## 2022-11-21 PROBLEM — M54.12 CERVICAL RADICULOPATHY: Status: ACTIVE | Noted: 2022-11-21

## 2022-11-21 PROBLEM — M54.50 CHRONIC RIGHT-SIDED LOW BACK PAIN WITHOUT SCIATICA: Status: ACTIVE | Noted: 2019-07-15

## 2022-11-21 PROBLEM — G89.29 CHRONIC RIGHT-SIDED LOW BACK PAIN WITHOUT SCIATICA: Status: ACTIVE | Noted: 2019-07-15

## 2022-11-21 NOTE — PATIENT INSTRUCTIONS
Plan  The patient will continue with her home exercise program.    The patient does not need any injections at this time. The patient does not need any pain medications at this time. She will follow up in 3 months or sooner if needed.

## 2023-02-27 PROBLEM — M25.551 BILATERAL HIP PAIN: Status: ACTIVE | Noted: 2023-02-27

## 2023-02-27 PROBLEM — M25.552 BILATERAL HIP PAIN: Status: ACTIVE | Noted: 2023-02-27

## 2023-02-27 NOTE — PATIENT INSTRUCTIONS
Plan  She will get into the PT for the cervical spine and her bilateral hips. She will get bilateral hip x-rays. She will continue with her home exercise program for her lumbar spine. The patient does not need any injections at this time. The patient does not need any pain medications at this time. She will follow up in 2-3 months or sooner if needed.

## 2023-06-14 NOTE — TELEPHONE ENCOUNTER
Patient calling ( identified name and  ) states has an appointment   with Walter Colin and ws informed  by his office  that a referral is needed      Referral pended for your review, completion and approval.      Future Appointments   Date Time Provider Danna Mojica   2023 10:00 AM Alejandrina Christy MD [facility]   2023  1:30 PM Ruth Mitts, PT [facility]   2023 10:15 AM Ruth Mitts, PT [facility]   7/10/2023 11:45 AM Ruth Mitts, PT [facility]   2023 11:45 AM Ruth Mitts, PT [facility]   2023 11:00 AM Ruth Mitts, PT [facility]   2023 11:00 AM Ruth Mitts, PT [facility]   1/15/2024  9:00 AM Nicky Skiff, MD [facility]

## 2023-06-19 NOTE — PATIENT INSTRUCTIONS
Plan  She will continue with the PT and her home exercise program.    She does not need to have a C7-T1 ILESI at the is time. She will follow up in 3 months or sooner if needed.
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
## 2023-07-17 NOTE — PROGRESS NOTES
2023  Patient Name: Bony Dillon  YOB: 2000          MRN number:  [id number]  Referring Physician:  Milena Merrill    Progress  Summary    Dx:          Cervical disc disease (M50.90)  Lumbar disc disease (M51.9)  Lumbar herniated disc (M51.26)  Thoracic disc disease (M51.9)  Cervical radiculopathy (M54.12)  Bilateral hip pain (M25.551,M25.552)  Authorized # of Visits:  18 visits on POC         Next MD visit: none   Fall Risk: standard         Precautions:  general  Medication Changes since last visit?: No    Subjective: States she went to [place] 3 times last week and that is helping her low back. States she also uses heated seats in the car for her LB. States her neck feels very locked up today. States she knows she was using her neck too much during Pilates.     Pain Ratin-6/10 VAS    Objective:     Cervical AROM:  Pain (+/-)   Flexion 30    Extension 60    R Sidebend 35    L Sidebend 35    R Rotation 65    L Rotation 75    Protrusion WFL    Retraction Decreased 25% Flattened of the cervical lordosis     Repeated Motion Testing: repeated retraction/extension in sitting - decreased/no change      CARYN Testing:    CARYN Testing R L   Cervical Axial Rotation - +   Apical Expansion decreased decresed   Adduction Drop Test - -   Extension Drop Test NT NT   SLR 90 90   Trunk Rotation NT NT   Posterior Mediastinum Expansion Test decreased decreased   Standing Reach Test - -   Elevated and ER Anterior Rib yes no       Shoulder AROM:      R    L   Flex 180        180   Abd 180 180   ER Hand behind head Hand behind head     IR Hand to bra line Hand to bra line   Hort abd 45 45       Strength UE:   5/5 MMT Scale   R  L   Shoulder flex  5     5   Shoulder ext NT NT   Abduction (C5) 5 5   ER 4+ 4+   IR 5 5   Biceps (C6) 5 5   Wrist ext (C6) 4+ 5   Triceps (C7) 5 5   Wrist flex (C7) 5 5   EPL (C8)  5 5   Interossei (T1) 5 5     Strength Scapular: 5/5 MMT Scale   R L   Upper trap (C4) 4 4   Rhomboids 4- 4-   Mid trap 3+ 3+   Lower trap 3+ 3+   Serratus 3+ 3+        6/19/2023  Visit #8 7/10/2023  Visit #9 7/17/2023  Visit #10   Manual Therapy STM bilateral UT and cervical paraspinals    Gentle manual traction    R first rib mobs in supine    Prone unilateral PA's lower cervical spine STM bilateral UT and cervical paraspinals    Gentle manual traction    R first rib mobs in supine    Prone unilateral PA's lower cervical spine STM bilateral UT and cervical paraspinals    Gentle manual traction    R first rib mobs in supine    Prone unilateral PA's lower cervical spine   Therapeutic Exercise      Therapeutic Activity  Use of wider  on round brushes    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 She continues to experience increased symptoms after her workday and after doing Pilates. She has been working on upper body strengthening and cervical intrinsic strengthening along with core strengthening. She will benefit from continued skilled PT to further to decrease her pain and improve her functional mobility. Recommend skilled PT 1 times per week for up to 8 sessions. The pt is in agreement with the POC. Goals: The pt was educated on the plan of care, purpose and individual goals for therapy, precautions for therapy. All questions were answered. 1.  The pt will be independent in their HEP. MET 7/17/2023  2. Centralization of symptoms to the cervical spine.   MET 7/17/2023  3. The pt will be able to complete a modified workout program.  PROGRESSING 7/17/2023  4. The pt will be able to complete a full work day without an increase in symptoms. PROGRESSING   5. The pt will report a 50% decrease in symptoms. MET 7/17/2023  6. The pt will display neutral hip alignment. MET 7/17/2023      Frequency/Duration: Patient will be seen for 1 x/week or a total of 10 visits over a 90 day period. Treatment will include: Manual Therapy; Therapeutic Exercises; Neuromuscular Re-education; Therapeutic Activity; Patient education; Home exercise program instruction; TNE Education, Modalities as needed. Education or treatment limitation: None  Rehab Potential: good    Charges: Man3 (38), NM1(8) Total Timed Treatment: 46 min  Total Treatment Time: 46 min      Patient was advised of these findings, precautions, and treatment options and has agreed to actively participate in planning and for this course of care. Thank you for your referral. Please co-sign or sign and return this letter via fax as soon as possible to [phone]. If you have any questions, please contact me at Dept: [phone]. Sincerely,  Chelo Sims PT    Electronically signed by therapist: Chelo Sims PT    [de-identified] certification required: Yes  I certify the need for these services furnished under this plan of treatment and while under my care.     X___________________________________________________ Date____________________    Certification From: 2/59/7058  To:10/15/2023

## 2023-07-24 NOTE — PROGRESS NOTES
2023  Patient Name: Jose Cooper  YOB: 2000          MRN number:  [id number]  Referring Physician:  Angus Looney        Dx:          Cervical disc disease (M50.90)  Lumbar disc disease (M51.9)  Lumbar herniated disc (M51.26)  Thoracic disc disease (M51.9)  Cervical radiculopathy (M54.12)  Bilateral hip pain (M25.551,M25.552)  Authorized # of Visits:  18 visits on POC         Next MD visit: none   Fall Risk: standard         Precautions:  general  Medication Changes since last visit?: No    Subjective: States she went to [employer] 3 times last week and that is helping her low back. States she also uses heated seats in the car for her LB. States her neck feels very locked up today. States she knows she was using her neck too much during Pilates.     Pain Ratin-6/10 VAS    Objective:     Cervical AROM:  Pain (+/-)   Flexion 30    Extension 60    R Sidebend 35    L Sidebend 35    R Rotation 65    L Rotation 75    Protrusion WFL    Retraction Decreased 25% Flattened of the cervical lordosis     Repeated Motion Testing: repeated retraction/extension in sitting - decreased/no change      CARYN Testing:    CARYN Testing R L   Cervical Axial Rotation - +   Apical Expansion decreased decresed   Adduction Drop Test - -   Extension Drop Test NT NT   SLR 90 90   Trunk Rotation NT NT   Posterior Mediastinum Expansion Test decreased decreased   Standing Reach Test - -   Elevated and ER Anterior Rib yes no       Shoulder AROM:      R    L   Flex 180        180   Abd 180 180   ER Hand behind head Hand behind head     IR Hand to bra line Hand to bra line   Hort abd 45 45       Strength UE:   5/5 MMT Scale   R  L   Shoulder flex  5     5   Shoulder ext NT NT   Abduction (C5) 5 5   ER 4+ 4+   IR 5 5   Biceps (C6) 5 5   Wrist ext (C6) 4+ 5   Triceps (C7) 5 5   Wrist flex (C7) 5 5   EPL (C8)  5 5   Interossei (T1) 5 5     Strength Scapular: 5/5 MMT Scale   R L   Upper trap (C4) 4 4   Rhomboids 4- 4-   Mid trap 3+ 3+   Lower trap 3+ 3+   Serratus 3+ 3+        6/19/2023  Visit #8 7/10/2023  Visit #9 7/17/2023  Visit #10 7/24/2023  Visit #11   Manual Therapy STM bilateral UT and cervical paraspinals    Gentle manual traction    R first rib mobs in supine    Prone unilateral PA's lower cervical spine STM bilateral UT and cervical paraspinals    Gentle manual traction    R first rib mobs in supine    Prone unilateral PA's lower cervical spine STM bilateral UT and cervical paraspinals    Gentle manual traction    R first rib mobs in supine    Prone unilateral PA's lower cervical spine STM bilateral UT and cervical paraspinals    Gentle manual traction    R first rib mobs in supine    Prone unilateral PA's lower cervical spine   Therapeutic Exercise       Therapeutic Activity  Use of wider  on round brushes    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     Neuromuscular Education Supine diagonal flexion bilaterally    Modified all 4 belly lift Standing table supported IO/TA    Standing PME Cervical intrinsic   Sitting - no/yes motion  Ball on wall - no/yes motion L serratus punch in sitting with yellow Tband    L serratus punch in supine   TNE Education       HEP Supine diagonal flexion bilaterally    Modified all 4 belly lift Standing table supported IO/TA    Standing PME Cervical intrinsic   Sitting - no/yes motion  Ball on wall - no/yes motion L serratus punch in sitting with yellow Tband       Assessment:  No adverse effects to treatment. The pt continued to display STR's in the L cervical paraspinals. She has been compliant with her HEP. .  Continues to need cues to avoid overuse of the cervical paraspinals with movement. Reviewed/added L serratus punch in sitting with Tband. Goals: The pt was educated on the plan of care, purpose and individual goals for therapy, precautions for therapy. All questions were answered. 1.  The pt will be independent in their HEP. MET 7/17/2023  2. Centralization of symptoms to the cervical spine. MET 7/17/2023  3. The pt will be able to complete a modified workout program.  PROGRESSING 7/17/2023  4. The pt will be able to complete a full work day without an increase in symptoms. PROGRESSING   5. The pt will report a 50% decrease in symptoms. MET 7/17/2023  6. The pt will display neutral hip alignment. MET 7/17/2023      Frequency/Duration: Patient will be seen for 1 x/week or a total of 10 visits over a 90 day period. Treatment will include: Manual Therapy; Therapeutic Exercises; Neuromuscular Re-education; Therapeutic Activity; Patient education; Home exercise program instruction; TNE Education, Modalities as needed. Education or treatment limitation: None  Rehab Potential: good    Charges: Man3 (38), NM1(8) Total Timed Treatment: 46 min  Total Treatment Time: 46 min      Patient was advised of these findings, precautions, and treatment options and has agreed to actively participate in planning and for this course of care. Thank you for your referral. Please co-sign or sign and return this letter via fax as soon as possible to [phone]. If you have any questions, please contact me at Dept: [phone]. Sincerely,  Ezio Carroll PT    Electronically signed by therapist: Ezio Carroll PT    [de-identified] certification required: Yes  I certify the need for these services furnished under this plan of treatment and while under my care.     X___________________________________________________ Date____________________    Certification From: 5/61/4947  To:10/15/2023

## 2023-07-31 NOTE — PROGRESS NOTES
2023    Patient Name: Clemencia Infante  YOB: 2000          MRN number:  [id number]  Referring Physician:  Annette Rivas        Dx:          Cervical disc disease (M50.90)  Lumbar disc disease (M51.9)  Lumbar herniated disc (M51.26)  Thoracic disc disease (M51.9)  Cervical radiculopathy (M54.12)  Bilateral hip pain (M25.551,M25.552)  Authorized # of Visits:  18 visits on POC         Next MD visit: none   Fall Risk: standard         Precautions:  general  Medication Changes since last visit?: No    Subjective: States she had a massage on Friday and that helped. States she wakes in the morning in pain. States she is doing more Pilate's. Also doing some cardio. Denies radiating pain in the morning. States she feels mostly stiffness/soreness currently.     Pain Ratin/10 VAS    Objective:     Cervical AROM:  Pain (+/-)   Flexion 30    Extension 60    R Sidebend 35    L Sidebend 35    R Rotation 65    L Rotation 75    Protrusion WFL    Retraction Decreased 25% Flattened of the cervical lordosis     Repeated Motion Testing: repeated retraction/extension in sitting - decreased/no change      CARYN Testing:    CARYN Testing R L   Cervical Axial Rotation - +   Apical Expansion decreased decresed   Adduction Drop Test - -   Extension Drop Test NT NT   SLR 90 90   Trunk Rotation NT NT   Posterior Mediastinum Expansion Test decreased decreased   Standing Reach Test - -   Elevated and ER Anterior Rib yes no       Shoulder AROM:      R    L   Flex 180        180   Abd 180 180   ER Hand behind head Hand behind head     IR Hand to bra line Hand to bra line   Hort abd 45 45       Strength UE:   5/5 MMT Scale   R  L   Shoulder flex  5     5   Shoulder ext NT NT   Abduction (C5) 5 5   ER 4+ 4+   IR 5 5   Biceps (C6) 5 5   Wrist ext (C6) 4+ 5   Triceps (C7) 5 5   Wrist flex (C7) 5 5   EPL (C8)  5 5   Interossei (T1) 5 5     Strength Scapular: 5/5 MMT Scale   R L   Upper trap (C4) 4 4   Rhomboids 4- 4-   Mid trap 3+ 3+   Lower trap 3+ 3+   Serratus 3+ 3+        7/17/2023  Visit #10 7/24/2023  Visit #11 7/31/2023  Visit #12   Manual Therapy STM bilateral UT and cervical paraspinals    Gentle manual traction    R first rib mobs in supine    Prone unilateral PA's lower cervical spine STM bilateral UT and cervical paraspinals    Gentle manual traction    R first rib mobs in supine    Prone unilateral PA's lower cervical spine STM bilateral UT and cervical paraspinals    Gentle manual traction      Prone unilateral PA's lower cervical spine   Therapeutic Exercise   Chin tuck in sitting with 45 degree angle    Retraction/extension over edge of th chair   Therapeutic Activity      Neuromuscular Education Cervical intrinsic   Sitting - no/yes motion  Ball on wall - no/yes motion L serratus punch in sitting with yellow Tband    L serratus punch in supine    TNE Education      HEP Cervical intrinsic   Sitting - no/yes motion  Ball on wall - no/yes motion L serratus punch in sitting with yellow Tband Chin tuck in sitting with 45 degree angle    Retraction/extension over edge of th chair       Assessment:  No adverse effects to treatment. The pt displays systemic hypermobility and continues to have neck pain in the morning. Added retraction in sitting with head tilted to a 45 degree angle to decrease prominence of the CT junction. The pt was also able to perform retraction over the edge of the chair and reported no UT by the end of the session. Goals: The pt was educated on the plan of care, purpose and individual goals for therapy, precautions for therapy. All questions were answered. 1.  The pt will be independent in their HEP. MET 7/17/2023  2. Centralization of symptoms to the cervical spine. MET 7/17/2023  3. The pt will be able to complete a modified workout program.  PROGRESSING 7/17/2023  4. The pt will be able to complete a full work day without an increase in symptoms. PROGRESSING   5.   The pt will report a 50% decrease in symptoms. MET 7/17/2023  6. The pt will display neutral hip alignment. MET 7/17/2023      Frequency/Duration: Patient will be seen for 1 x/week or a total of 10 visits over a 90 day period. Treatment will include: Manual Therapy; Therapeutic Exercises; Neuromuscular Re-education; Therapeutic Activity; Patient education; Home exercise program instruction; TNE Education, Modalities as needed. Education or treatment limitation: None  Rehab Potential: good    Charges: Man3 (38), TE1(8) Total Timed Treatment: 46 min  Total Treatment Time: 46 min      Patient was advised of these findings, precautions, and treatment options and has agreed to actively participate in planning and for this course of care. Thank you for your referral. Please co-sign or sign and return this letter via fax as soon as possible to [phone]. If you have any questions, please contact me at Dept: [phone]. Sincerely,  Emy Hager PT    Electronically signed by therapist: Emy Hager PT    [de-identified] certification required: Yes  I certify the need for these services furnished under this plan of treatment and while under my care.     X___________________________________________________ Date____________________    Certification From: 3/68/0635  To:10/15/2023

## 2023-08-07 NOTE — PROGRESS NOTES
2023    Patient Name: Dania Smith  YOB: 2000          MRN number:  [id number]  Referring Physician:  Jordy Louie        Dx:          Cervical disc disease (M50.90)  Lumbar disc disease (M51.9)  Lumbar herniated disc (M51.26)  Thoracic disc disease (M51.9)  Cervical radiculopathy (M54.12)  Bilateral hip pain (M25.551,M25.552)  Authorized # of Visits:  18 visits on POC         Next MD visit: none   Fall Risk: standard         Precautions:  general  Medication Changes since last visit?: No    Subjective: States she feel the new exercises are helping her. Felt better after the last visit.   Pain Ratin/10 VAS    Objective:     Cervical AROM:  Pain (+/-)   Flexion 30    Extension 60    R Sidebend 35    L Sidebend 35    R Rotation 65    L Rotation 75    Protrusion WFL    Retraction Decreased 25% Flattened of the cervical lordosis     Repeated Motion Testing: repeated retraction/extension in sitting - decreased/no change      CARYN Testing:    CARYN Testing R L   Cervical Axial Rotation - +   Apical Expansion decreased decresed   Adduction Drop Test - -   Extension Drop Test NT NT   SLR 90 90   Trunk Rotation NT NT   Posterior Mediastinum Expansion Test decreased decreased   Standing Reach Test - -   Elevated and ER Anterior Rib yes no       Shoulder AROM:      R    L   Flex 180        180   Abd 180 180   ER Hand behind head Hand behind head     IR Hand to bra line Hand to bra line   Hort abd 45 45       Strength UE:   5/5 MMT Scale   R  L   Shoulder flex  5     5   Shoulder ext NT NT   Abduction (C5) 5 5   ER 4+ 4+   IR 5 5   Biceps (C6) 5 5   Wrist ext (C6) 4+ 5   Triceps (C7) 5 5   Wrist flex (C7) 5 5   EPL (C8)  5 5   Interossei (T1) 5 5     Strength Scapular: 5/5 MMT Scale   R L   Upper trap (C4) 4 4   Rhomboids 4- 4-   Mid trap 3+ 3+   Lower trap 3+ 3+   Serratus 3+ 3+        2023  Visit #10 2023  Visit #11 2023  Visit #12 2023  Visit #13   Manual Therapy STM bilateral UT and cervical paraspinals    Gentle manual traction    R first rib mobs in supine    Prone unilateral PA's lower cervical spine STM bilateral UT and cervical paraspinals    Gentle manual traction    R first rib mobs in supine    Prone unilateral PA's lower cervical spine STM bilateral UT and cervical paraspinals    Gentle manual traction      Prone unilateral PA's lower cervical spine STM bilateral UT and cervical paraspinals    Gentle manual traction      Prone unilateral PA's lower cervical spine   Therapeutic Exercise   Chin tuck in sitting with 45 degree angle    Retraction/extension over edge of th chair    Therapeutic Activity       Neuromuscular Education Cervical intrinsic   Sitting - no/yes motion  Ball on wall - no/yes motion L serratus punch in sitting with yellow Tband    L serratus punch in supine  Alternating triceps in standing    Alternating serratus punch in sitting with yellow Tband   TNE Education       HEP Cervical intrinsic   Sitting - no/yes motion  Ball on wall - no/yes motion L serratus punch in sitting with yellow Tband Chin tuck in sitting with 45 degree angle    Retraction/extension over edge of th chair Alternating triceps in standing    Alternating serratus punch in sitting with yellow Tband       Assessment:  No adverse effects to treatment. Improved mobility at the CT junction this date with less pain this date. Added more alternating serratus anterior and triceps activities for HEP. Goals: The pt was educated on the plan of care, purpose and individual goals for therapy, precautions for therapy. All questions were answered. 1.  The pt will be independent in their HEP. MET 7/17/2023  2. Centralization of symptoms to the cervical spine. MET 7/17/2023  3. The pt will be able to complete a modified workout program.  PROGRESSING 7/17/2023  4. The pt will be able to complete a full work day without an increase in symptoms. PROGRESSING   5.   The pt will report a 50% decrease in symptoms. MET 7/17/2023  6. The pt will display neutral hip alignment. MET 7/17/2023      Frequency/Duration: Patient will be seen for 1 x/week or a total of 10 visits over a 90 day period. Treatment will include: Manual Therapy; Therapeutic Exercises; Neuromuscular Re-education; Therapeutic Activity; Patient education; Home exercise program instruction; TNE Education, Modalities as needed. Education or treatment limitation: None  Rehab Potential: good    Charges: Man3 (38), NM1(8) Total Timed Treatment: 46 min  Total Treatment Time: 46 min      Patient was advised of these findings, precautions, and treatment options and has agreed to actively participate in planning and for this course of care. Thank you for your referral. Please co-sign or sign and return this letter via fax as soon as possible to [phone]. If you have any questions, please contact me at Dept: [phone]. Sincerely,  Marilee Moran PT    Electronically signed by therapist: Marilee Moran PT    [de-identified] certification required: Yes  I certify the need for these services furnished under this plan of treatment and while under my care.     X___________________________________________________ Date____________________    Certification From: 2/38/2536  To:10/15/2023

## 2023-08-21 NOTE — PROGRESS NOTES
2023    Patient Name: Dania Smith  YOB: 2000          MRN number:  [id number]  Referring Physician:  Jordy Louie        Dx:          Cervical disc disease (M50.90)  Lumbar disc disease (M51.9)  Lumbar herniated disc (M51.26)  Thoracic disc disease (M51.9)  Cervical radiculopathy (M54.12)  Bilateral hip pain (M25.551,M25.552)  Authorized # of Visits:  18 visits on POC         Next MD visit: none   Fall Risk: standard         Precautions:  general  Medication Changes since last visit?: No    Subjective: States she is feeling better overall and feels like she is healing. Reports less pain now that she is working out more consistently. Doing Pilate's at least two days a week and going to the gym.     Pain Ratin/10 VAS    Objective:     Cervical AROM:  2023   Flexion 30 55   Extension 60 65   R Sidebend 35 35   L Sidebend 35 40   R Rotation 65 80   L Rotation 75 75   Protrusion WFL WFL   Retraction Decreased 25% WFL     Repeated Motion Testing: repeated retraction/extension in sitting - decreased/no change      CARYN Testing:    CARYN Testing R L   Cervical Axial Rotation - +   Apical Expansion decreased decresed   Adduction Drop Test - -   Extension Drop Test NT NT   SLR 90 90   Trunk Rotation NT NT   Posterior Mediastinum Expansion Test decreased decreased   Standing Reach Test - -   Elevated and ER Anterior Rib yes no       Shoulder AROM:      R    L   Flex 180        180   Abd 180 180   ER Hand behind head Hand behind head     IR Hand to bra line Hand to bra line   Hort abd 45 45       Strength UE:   5/5 MMT Scale   R  L   Shoulder flex  5     5   Shoulder ext NT NT   Abduction (C5) 5 5   ER 4+ 4+   IR 5 5   Biceps (C6) 5 5   Wrist ext (C6) 5 5   Triceps (C7) 5 5   Wrist flex (C7) 5 5   EPL (C8)  5 5   Interossei (T1) 5 5     Strength Scapular: 5/5 MMT Scale   R L   Upper trap (C4) 4 4   Rhomboids 4- 4-   Mid trap 3+ 3+   Lower trap 3+ 3+   Serratus 3+ 3+       NDI Post 4% 7/17/2023  Visit #10 7/24/2023  Visit #11 7/31/2023  Visit #12 8/7/2023  Visit #13 8/21/2023  Visit #14   Manual Therapy STM bilateral UT and cervical paraspinals    Gentle manual traction    R first rib mobs in supine    Prone unilateral PA's lower cervical spine STM bilateral UT and cervical paraspinals    Gentle manual traction    R first rib mobs in supine    Prone unilateral PA's lower cervical spine STM bilateral UT and cervical paraspinals    Gentle manual traction      Prone unilateral PA's lower cervical spine STM bilateral UT and cervical paraspinals    Gentle manual traction      Prone unilateral PA's lower cervical spine STM bilateral UT and cervical paraspinals    Gentle manual traction      Prone unilateral PA's lower cervical spine    First rib mob R    Therapeutic Exercise   Chin tuck in sitting with 45 degree angle    Retraction/extension over edge of th chair  Review of HEP    Re-assessment   Therapeutic Activity        Neuromuscular Education Cervical intrinsic   Sitting - no/yes motion  Ball on wall - no/yes motion L serratus punch in sitting with yellow Tband    L serratus punch in supine  Alternating triceps in standing    Alternating serratus punch in sitting with yellow Tband    TNE Education        HEP Cervical intrinsic   Sitting - no/yes motion  Ball on wall - no/yes motion L serratus punch in sitting with yellow Tband Chin tuck in sitting with 45 degree angle    Retraction/extension over edge of th chair Alternating triceps in standing    Alternating serratus punch in sitting with yellow Tband Continue with current HEP       Assessment:   Mundo Callahan has completed 14 visits of PT and has progressed very well. She now displays functional cervical ROM, improved UE strength (all 5/5 except for 4/5 with bilateral shoulder ER), ,improved soft tissue mobility in the UT's and less pain overall. She is independent and compliant with her [facility] HEP and all goals have been met.   She continues to have pain after completing full work days at [employer] and may benefit from skilled PT in the future. The pt will trial her HEP for 2-3 months and then re-evaluate with the MD.  The pt was advised to contact PT through [employer] with any questions. Goals: The pt was educated on the plan of care, purpose and individual goals for therapy, precautions for therapy. All questions were answered. 1.  The pt will be independent in their HEP. MET 7/17/2023  2. Centralization of symptoms to the cervical spine. MET 7/17/2023  3. The pt will be able to complete a modified workout program.  Met 8/21/2023  4. The pt will be able to complete a full work day without an increase in symptoms. PROGRESSING  8/21/2023  5. The pt will report a 50% decrease in symptoms. MET 7/17/2023  6. The pt will display neutral hip alignment. MET 7/17/2023      Plan:  DC PT at this time    Education or treatment limitation: None  Rehab Potential: good    Charges: Man3 (38), TE11(8) Total Timed Treatment: 46 min  Total Treatment Time: 46 min      Patient was advised of these findings, precautions, and treatment options and has agreed to actively participate in planning and for this course of care. Thank you for your referral. Please co-sign or sign and return this letter via fax as soon as possible to [phone]. If you have any questions, please contact me at Dept: [phone]. Sincerely,  Shiva Palomino PT    Electronically signed by therapist: Shiva Palomino PT    [de-identified] certification required: Yes  I certify the need for these services furnished under this plan of treatment and while under my care.     X___________________________________________________ Date____________________    Certification From: 8/03/3987  To:10/15/2023

## 2023-11-20 NOTE — TELEPHONE ENCOUNTER
Received this message from [place], PT: \"Good Morning Dr. Harry Sosa. Opal Gutierrez came back in for her cervical spine today. She is having more pain. She has a follow up appointment with you on 12/11. Would you mind putting in a updated prescription in the system for insurance? Her last prescription is from Feb 2023. She will MyChart message you today as well. Thank you, Melly Platap"      This RN was given verbal authorization to place new order for PT from previous order in Feb 2023.
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
## 2023-12-11 NOTE — PATIENT INSTRUCTIONS
Plan  I will do a left C7-T1 ILESI. She will do scapular retraction exercises that we discussed in the office today. The patient will continue with her home exercise program.    The patient will continue with PT. The patient will follow up in 2-3 months, but the patient will call me 2 weeks after having the injection to let me know how the injection worked.
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
## 2023-12-18 NOTE — TELEPHONE ENCOUNTER
Junior Campbell in the lab called regarding a name discrepancy for [facility]. Label on pap has [place], but in [address] [place] [address] Rd, pt's name is Taniya Delaney. J[facility]'s notes indicate pt's last name as Schoeps. Spoke with pt. She states her maiden name was Grollo but it has now changed to [employer]. Pt states she did let the  know and they scanned in her new ID.  looked into it, but there is no new ID scanned. Unsure how new name was printed on label. Spoke with Junior Campbell. She faxed a name discrepancy form to be filled out. Form filled out and faxed back.

## 2023-12-26 ENCOUNTER — TELEPHONE (OUTPATIENT)
Dept: PHYSICAL MEDICINE AND REHAB | Facility: CLINIC | Age: 23
End: 2023-12-26

## 2023-12-26 NOTE — TELEPHONE ENCOUNTER
Pt called stating she has some worsening neck pain as well as headaches with a fever 100.7 starting yesterday. Pt is doing PT however has not had any recent injections or procedures. Educated patient to reach out to her primary doctor especially since she has a fever to rule out any infection. Once she has that treated, if the pain continues to be worse then advised patient to call back our office. Pt verbalized understanding. She will reach out to her PCP office.

## 2023-12-27 ENCOUNTER — LAB ENCOUNTER (OUTPATIENT)
Dept: LAB | Age: 23
End: 2023-12-27
Attending: NURSE PRACTITIONER
Payer: COMMERCIAL

## 2023-12-27 ENCOUNTER — OFFICE VISIT (OUTPATIENT)
Dept: INTERNAL MEDICINE CLINIC | Facility: CLINIC | Age: 23
End: 2023-12-27

## 2023-12-27 ENCOUNTER — NURSE TRIAGE (OUTPATIENT)
Dept: INTERNAL MEDICINE CLINIC | Facility: CLINIC | Age: 23
End: 2023-12-27

## 2023-12-27 VITALS
OXYGEN SATURATION: 99 % | WEIGHT: 147 LBS | HEIGHT: 65 IN | BODY MASS INDEX: 24.49 KG/M2 | DIASTOLIC BLOOD PRESSURE: 81 MMHG | SYSTOLIC BLOOD PRESSURE: 129 MMHG | TEMPERATURE: 100 F | HEART RATE: 107 BPM

## 2023-12-27 DIAGNOSIS — R50.9 FEVER, UNSPECIFIED FEVER CAUSE: Primary | ICD-10-CM

## 2023-12-27 DIAGNOSIS — R50.9 FEVER, UNSPECIFIED FEVER CAUSE: ICD-10-CM

## 2023-12-27 LAB
ANION GAP SERPL CALC-SCNC: 2 MMOL/L (ref 0–18)
BASOPHILS # BLD AUTO: 0.03 X10(3) UL (ref 0–0.2)
BASOPHILS NFR BLD AUTO: 1.2 %
BUN BLD-MCNC: 7 MG/DL (ref 9–23)
BUN/CREAT SERPL: 10.4 (ref 10–20)
CALCIUM BLD-MCNC: 9.1 MG/DL (ref 8.7–10.4)
CHLORIDE SERPL-SCNC: 106 MMOL/L (ref 98–112)
CO2 SERPL-SCNC: 28 MMOL/L (ref 21–32)
CREAT BLD-MCNC: 0.67 MG/DL
DEPRECATED RDW RBC AUTO: 37.8 FL (ref 35.1–46.3)
EGFRCR SERPLBLD CKD-EPI 2021: 125 ML/MIN/1.73M2 (ref 60–?)
EOSINOPHIL # BLD AUTO: 0 X10(3) UL (ref 0–0.7)
EOSINOPHIL NFR BLD AUTO: 0 %
ERYTHROCYTE [DISTWIDTH] IN BLOOD BY AUTOMATED COUNT: 11.8 % (ref 11–15)
FASTING STATUS PATIENT QL REPORTED: YES
GLUCOSE BLD-MCNC: 100 MG/DL (ref 70–99)
HCT VFR BLD AUTO: 41.2 %
HGB BLD-MCNC: 14.1 G/DL
IMM GRANULOCYTES # BLD AUTO: 0.01 X10(3) UL (ref 0–1)
IMM GRANULOCYTES NFR BLD: 0.4 %
LYMPHOCYTES # BLD AUTO: 0.9 X10(3) UL (ref 1–4)
LYMPHOCYTES NFR BLD AUTO: 35.7 %
MCH RBC QN AUTO: 29.9 PG (ref 26–34)
MCHC RBC AUTO-ENTMCNC: 34.2 G/DL (ref 31–37)
MCV RBC AUTO: 87.5 FL
MONOCYTES # BLD AUTO: 0.14 X10(3) UL (ref 0.1–1)
MONOCYTES NFR BLD AUTO: 5.6 %
NEUTROPHILS # BLD AUTO: 1.44 X10 (3) UL (ref 1.5–7.7)
NEUTROPHILS # BLD AUTO: 1.44 X10(3) UL (ref 1.5–7.7)
NEUTROPHILS NFR BLD AUTO: 57.1 %
OSMOLALITY SERPL CALC.SUM OF ELEC: 280 MOSM/KG (ref 275–295)
PLATELET # BLD AUTO: 129 10(3)UL (ref 150–450)
POTASSIUM SERPL-SCNC: 4 MMOL/L (ref 3.5–5.1)
RBC # BLD AUTO: 4.71 X10(6)UL
SODIUM SERPL-SCNC: 136 MMOL/L (ref 136–145)
WBC # BLD AUTO: 2.5 X10(3) UL (ref 4–11)

## 2023-12-27 PROCEDURE — 85025 COMPLETE CBC W/AUTO DIFF WBC: CPT

## 2023-12-27 PROCEDURE — 3079F DIAST BP 80-89 MM HG: CPT | Performed by: NURSE PRACTITIONER

## 2023-12-27 PROCEDURE — 3074F SYST BP LT 130 MM HG: CPT | Performed by: NURSE PRACTITIONER

## 2023-12-27 PROCEDURE — 80048 BASIC METABOLIC PNL TOTAL CA: CPT

## 2023-12-27 PROCEDURE — 36415 COLL VENOUS BLD VENIPUNCTURE: CPT

## 2023-12-27 PROCEDURE — 3008F BODY MASS INDEX DOCD: CPT | Performed by: NURSE PRACTITIONER

## 2023-12-27 PROCEDURE — 99213 OFFICE O/P EST LOW 20 MIN: CPT | Performed by: NURSE PRACTITIONER

## 2023-12-27 NOTE — TELEPHONE ENCOUNTER
Action Requested: Summary for Provider     []  Critical Lab, Recommendations Needed  [] Need Additional Advice  []   FYI    []   Need Orders  [] Need Medications Sent to Pharmacy  []  Other     SUMMARY: appt made, h/a x 5 days, fever started Monday night, neg COVID test, staying hydrated , no other s/s, taking ibuprofen    Reason for call: Fever  Onset: 5 days                   Reason for Disposition   Fever present > 3 days (72 hours)    Protocols used: Fever-A-OH

## 2023-12-27 NOTE — PATIENT INSTRUCTIONS
Increase your fluid intake    Ok to take ibuprofen and alternate with tylenol for neck pain and headaches.

## 2023-12-28 ENCOUNTER — HOSPITAL ENCOUNTER (EMERGENCY)
Age: 23
Discharge: HOME | End: 2023-12-29
Attending: STUDENT IN AN ORGANIZED HEALTH CARE EDUCATION/TRAINING PROGRAM

## 2023-12-28 ENCOUNTER — PATIENT MESSAGE (OUTPATIENT)
Dept: INTERNAL MEDICINE CLINIC | Facility: CLINIC | Age: 23
End: 2023-12-28

## 2023-12-28 DIAGNOSIS — R50.9 FEVER, UNSPECIFIED FEVER CAUSE: Primary | ICD-10-CM

## 2023-12-28 DIAGNOSIS — G44.209 TENSION-TYPE HEADACHE, NOT INTRACTABLE, UNSPECIFIED CHRONICITY PATTERN: ICD-10-CM

## 2023-12-28 LAB
ALBUMIN SERPL-MCNC: 3.6 G/DL (ref 3.6–5.1)
ALBUMIN SERPL-MCNC: 3.6 G/DL (ref 3.6–5.1)
ALBUMIN/GLOB SERPL: 1 {RATIO} (ref 1–2.4)
ALBUMIN/GLOB SERPL: 1 {RATIO} (ref 1–2.4)
ALP SERPL-CCNC: 132 UNITS/L (ref 45–117)
ALP SERPL-CCNC: 132 UNITS/L (ref 45–117)
ALT SERPL-CCNC: 109 UNITS/L
ALT SERPL-CCNC: 109 UNITS/L
ANION GAP SERPL CALC-SCNC: 12 MMOL/L (ref 7–19)
ANION GAP SERPL CALC-SCNC: 12 MMOL/L (ref 7–19)
AST SERPL-CCNC: 72 UNITS/L
AST SERPL-CCNC: 72 UNITS/L
BASOPHILS # BLD: 0 K/MCL (ref 0–0.3)
BASOPHILS # BLD: 0 K/MCL (ref 0–0.3)
BASOPHILS NFR BLD: 1 %
BASOPHILS NFR BLD: 1 %
BILIRUB SERPL-MCNC: 0.5 MG/DL (ref 0.2–1)
BILIRUB SERPL-MCNC: 0.5 MG/DL (ref 0.2–1)
BUN SERPL-MCNC: 11 MG/DL (ref 6–20)
BUN SERPL-MCNC: 11 MG/DL (ref 6–20)
BUN/CREAT SERPL: 18 (ref 7–25)
BUN/CREAT SERPL: 18 (ref 7–25)
CALCIUM SERPL-MCNC: 8.6 MG/DL (ref 8.4–10.2)
CALCIUM SERPL-MCNC: 8.6 MG/DL (ref 8.4–10.2)
CHLORIDE SERPL-SCNC: 103 MMOL/L (ref 97–110)
CHLORIDE SERPL-SCNC: 103 MMOL/L (ref 97–110)
CO2 SERPL-SCNC: 30 MMOL/L (ref 21–32)
CO2 SERPL-SCNC: 30 MMOL/L (ref 21–32)
CREAT SERPL-MCNC: 0.62 MG/DL (ref 0.51–0.95)
CREAT SERPL-MCNC: 0.62 MG/DL (ref 0.51–0.95)
DEPRECATED RDW RBC: 37.2 FL (ref 39–50)
DEPRECATED RDW RBC: 37.2 FL (ref 39–50)
EGFRCR SERPLBLD CKD-EPI 2021: >90 ML/MIN/{1.73_M2}
EGFRCR SERPLBLD CKD-EPI 2021: >90 ML/MIN/{1.73_M2}
EOSINOPHIL # BLD: 0 K/MCL (ref 0–0.5)
EOSINOPHIL # BLD: 0 K/MCL (ref 0–0.5)
EOSINOPHIL NFR BLD: 0 %
EOSINOPHIL NFR BLD: 0 %
ERYTHROCYTE [DISTWIDTH] IN BLOOD: 11.8 % (ref 11–15)
ERYTHROCYTE [DISTWIDTH] IN BLOOD: 11.8 % (ref 11–15)
FASTING DURATION TIME PATIENT: ABNORMAL H
FASTING DURATION TIME PATIENT: ABNORMAL H
FLUAV + FLUBV RNA SPEC NAA+PROBE: NOT DETECTED
FLUAV + FLUBV RNA SPEC NAA+PROBE: NOT DETECTED
FLUAV RNA RESP QL NAA+PROBE: NOT DETECTED
FLUAV RNA RESP QL NAA+PROBE: NOT DETECTED
FLUBV RNA RESP QL NAA+PROBE: NOT DETECTED
FLUBV RNA RESP QL NAA+PROBE: NOT DETECTED
GLOBULIN SER-MCNC: 3.6 G/DL (ref 2–4)
GLOBULIN SER-MCNC: 3.6 G/DL (ref 2–4)
GLUCOSE SERPL-MCNC: 100 MG/DL (ref 70–99)
GLUCOSE SERPL-MCNC: 100 MG/DL (ref 70–99)
HCG UR QL: NEGATIVE
HCG UR QL: NEGATIVE
HCT VFR BLD CALC: 38 % (ref 36–46.5)
HCT VFR BLD CALC: 38 % (ref 36–46.5)
HGB BLD-MCNC: 13.3 G/DL (ref 12–15.5)
HGB BLD-MCNC: 13.3 G/DL (ref 12–15.5)
IMM GRANULOCYTES # BLD AUTO: 0 K/MCL (ref 0–0.2)
IMM GRANULOCYTES # BLD AUTO: 0 K/MCL (ref 0–0.2)
IMM GRANULOCYTES # BLD: 0 %
IMM GRANULOCYTES # BLD: 0 %
LACTATE BLDV-SCNC: 1.1 MMOL/L (ref 0–2)
LACTATE BLDV-SCNC: 1.1 MMOL/L (ref 0–2)
LYMPHOCYTES # BLD: 1.6 K/MCL (ref 1–4.8)
LYMPHOCYTES # BLD: 1.6 K/MCL (ref 1–4.8)
LYMPHOCYTES NFR BLD: 63 %
LYMPHOCYTES NFR BLD: 63 %
MCH RBC QN AUTO: 30 PG (ref 26–34)
MCH RBC QN AUTO: 30 PG (ref 26–34)
MCHC RBC AUTO-ENTMCNC: 35 G/DL (ref 32–36.5)
MCHC RBC AUTO-ENTMCNC: 35 G/DL (ref 32–36.5)
MCV RBC AUTO: 85.8 FL (ref 78–100)
MCV RBC AUTO: 85.8 FL (ref 78–100)
MONOCYTES # BLD: 0.2 K/MCL (ref 0.3–0.9)
MONOCYTES # BLD: 0.2 K/MCL (ref 0.3–0.9)
MONOCYTES NFR BLD: 7 %
MONOCYTES NFR BLD: 7 %
NEUTROPHILS # BLD: 0.8 K/MCL (ref 1.8–7.7)
NEUTROPHILS # BLD: 0.8 K/MCL (ref 1.8–7.7)
NEUTROPHILS NFR BLD: 29 %
NEUTROPHILS NFR BLD: 29 %
NRBC BLD MANUAL-RTO: 0 /100 WBC
NRBC BLD MANUAL-RTO: 0 /100 WBC
PLATELET # BLD AUTO: 141 K/MCL (ref 140–450)
PLATELET # BLD AUTO: 141 K/MCL (ref 140–450)
POTASSIUM SERPL-SCNC: 3.6 MMOL/L (ref 3.4–5.1)
POTASSIUM SERPL-SCNC: 3.6 MMOL/L (ref 3.4–5.1)
PROT SERPL-MCNC: 7.2 G/DL (ref 6.4–8.2)
PROT SERPL-MCNC: 7.2 G/DL (ref 6.4–8.2)
RBC # BLD: 4.43 MIL/MCL (ref 4–5.2)
RBC # BLD: 4.43 MIL/MCL (ref 4–5.2)
RSV AG NPH QL IA.RAPID: NOT DETECTED
RSV AG NPH QL IA.RAPID: NOT DETECTED
RSV RNA SPEC NAA+PROBE: NOT DETECTED
S PYO DNA THROAT QL NAA+PROBE: NOT DETECTED
S PYO DNA THROAT QL NAA+PROBE: NOT DETECTED
SARS-COV-2 RNA RESP QL NAA+PROBE: NOT DETECTED
SERVICE CMNT-IMP: NORMAL
SODIUM SERPL-SCNC: 141 MMOL/L (ref 135–145)
SODIUM SERPL-SCNC: 141 MMOL/L (ref 135–145)
WBC # BLD: 2.6 K/MCL (ref 4.2–11)
WBC # BLD: 2.6 K/MCL (ref 4.2–11)

## 2023-12-28 PROCEDURE — 87651 STREP A DNA AMP PROBE: CPT | Performed by: NURSE PRACTITIONER

## 2023-12-28 PROCEDURE — 80053 COMPREHEN METABOLIC PANEL: CPT | Performed by: NURSE PRACTITIONER

## 2023-12-28 PROCEDURE — 87040 BLOOD CULTURE FOR BACTERIA: CPT | Performed by: NURSE PRACTITIONER

## 2023-12-28 PROCEDURE — 85025 COMPLETE CBC W/AUTO DIFF WBC: CPT | Performed by: NURSE PRACTITIONER

## 2023-12-28 PROCEDURE — 81001 URINALYSIS AUTO W/SCOPE: CPT | Performed by: STUDENT IN AN ORGANIZED HEALTH CARE EDUCATION/TRAINING PROGRAM

## 2023-12-28 PROCEDURE — 87086 URINE CULTURE/COLONY COUNT: CPT | Performed by: STUDENT IN AN ORGANIZED HEALTH CARE EDUCATION/TRAINING PROGRAM

## 2023-12-28 PROCEDURE — 0241U COVID/FLU/RSV PANEL: CPT | Performed by: NURSE PRACTITIONER

## 2023-12-28 PROCEDURE — 83605 ASSAY OF LACTIC ACID: CPT | Performed by: NURSE PRACTITIONER

## 2023-12-28 PROCEDURE — 84703 CHORIONIC GONADOTROPIN ASSAY: CPT

## 2023-12-28 ASSESSMENT — PAIN SCALES - GENERAL: PAINLEVEL_OUTOF10: 5

## 2023-12-29 ENCOUNTER — APPOINTMENT (OUTPATIENT)
Dept: CT IMAGING | Age: 23
End: 2023-12-29
Attending: STUDENT IN AN ORGANIZED HEALTH CARE EDUCATION/TRAINING PROGRAM

## 2023-12-29 VITALS
HEIGHT: 65 IN | SYSTOLIC BLOOD PRESSURE: 109 MMHG | TEMPERATURE: 99 F | HEIGHT: 65 IN | DIASTOLIC BLOOD PRESSURE: 74 MMHG | OXYGEN SATURATION: 96 % | OXYGEN SATURATION: 96 % | TEMPERATURE: 99 F | WEIGHT: 147.16 LBS | RESPIRATION RATE: 18 BRPM | RESPIRATION RATE: 18 BRPM | BODY MASS INDEX: 24.52 KG/M2 | DIASTOLIC BLOOD PRESSURE: 74 MMHG | BODY MASS INDEX: 24.52 KG/M2 | HEART RATE: 102 BPM | SYSTOLIC BLOOD PRESSURE: 109 MMHG | HEART RATE: 102 BPM | WEIGHT: 147.16 LBS

## 2023-12-29 LAB
APPEARANCE UR: ABNORMAL
APPEARANCE UR: ABNORMAL
BACTERIA #/AREA URNS HPF: ABNORMAL /HPF
BACTERIA #/AREA URNS HPF: ABNORMAL /HPF
BILIRUB UR QL STRIP: NEGATIVE
BILIRUB UR QL STRIP: NEGATIVE
CAOX CRY URNS QL MICRO: PRESENT
CAOX CRY URNS QL MICRO: PRESENT
COLOR UR: YELLOW
COLOR UR: YELLOW
GLUCOSE UR STRIP-MCNC: NEGATIVE MG/DL
GLUCOSE UR STRIP-MCNC: NEGATIVE MG/DL
HGB UR QL STRIP: ABNORMAL
HGB UR QL STRIP: ABNORMAL
HYALINE CASTS #/AREA URNS LPF: ABNORMAL /LPF
HYALINE CASTS #/AREA URNS LPF: ABNORMAL /LPF
KETONES UR STRIP-MCNC: NEGATIVE MG/DL
KETONES UR STRIP-MCNC: NEGATIVE MG/DL
LEUKOCYTE ESTERASE UR QL STRIP: ABNORMAL
LEUKOCYTE ESTERASE UR QL STRIP: ABNORMAL
MUCOUS THREADS URNS QL MICRO: PRESENT
MUCOUS THREADS URNS QL MICRO: PRESENT
NITRITE UR QL STRIP: NEGATIVE
NITRITE UR QL STRIP: NEGATIVE
PH UR STRIP: 5 [PH] (ref 5–7)
PH UR STRIP: 5 [PH] (ref 5–7)
PROT UR STRIP-MCNC: 30 MG/DL
PROT UR STRIP-MCNC: 30 MG/DL
RBC #/AREA URNS HPF: ABNORMAL /HPF
RBC #/AREA URNS HPF: ABNORMAL /HPF
SP GR UR STRIP: >1.03 (ref 1–1.03)
SP GR UR STRIP: >1.03 (ref 1–1.03)
SQUAMOUS #/AREA URNS HPF: ABNORMAL /HPF
SQUAMOUS #/AREA URNS HPF: ABNORMAL /HPF
UROBILINOGEN UR STRIP-MCNC: 2 MG/DL
UROBILINOGEN UR STRIP-MCNC: 2 MG/DL
WBC #/AREA URNS HPF: ABNORMAL /HPF
WBC #/AREA URNS HPF: ABNORMAL /HPF

## 2023-12-29 PROCEDURE — 70450 CT HEAD/BRAIN W/O DYE: CPT

## 2023-12-29 NOTE — TELEPHONE ENCOUNTER
From: Param Shields  To: Bety Shipman  Sent: 12/28/2023 10:19 AM CST  Subject: Blood work    I saw my blood work results came in yesterday but no one has reviewed them yet. Can someone review them and get back to me today about what is going on.    Thank you  Ritesh Light

## 2023-12-30 LAB
BACTERIA UR CULT: ABNORMAL
BACTERIA UR CULT: ABNORMAL

## 2023-12-31 LAB
BACTERIA BLD CULT: NORMAL
BACTERIA BLD CULT: NORMAL

## 2024-01-03 LAB
BACTERIA BLD CULT: NORMAL
BACTERIA BLD CULT: NORMAL

## 2024-01-12 ENCOUNTER — HOSPITAL ENCOUNTER (OUTPATIENT)
Age: 24
Discharge: HOME | End: 2024-01-12
Payer: COMMERCIAL

## 2024-01-12 VITALS
DIASTOLIC BLOOD PRESSURE: 63 MMHG | OXYGEN SATURATION: 100 % | TEMPERATURE: 98 F | SYSTOLIC BLOOD PRESSURE: 132 MMHG | HEART RATE: 88 BPM | RESPIRATION RATE: 18 BRPM

## 2024-01-12 DIAGNOSIS — J02.9 VIRAL PHARYNGITIS: Primary | ICD-10-CM

## 2024-01-12 LAB — S PYO AG THROAT QL: NEGATIVE

## 2024-01-12 NOTE — DISCHARGE INSTRUCTIONS
Rapid strep is negative.  You may use Tylenol or Motrin as needed for pain.  Salt water gargles.  Stay hydrated.  Follow-up with your primary doctor for persistent symptoms
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
## 2024-01-12 NOTE — ED INITIAL ASSESSMENT (HPI)
Pt complaining of sore throat since Monday night, states her tonsils are large with white patches. Pt had Viral Meningitis over tk.

## 2024-01-22 ENCOUNTER — OFFICE VISIT (OUTPATIENT)
Dept: PHYSICAL THERAPY | Facility: HOSPITAL | Age: 24
End: 2024-01-22
Attending: PHYSICAL MEDICINE & REHABILITATION
Payer: COMMERCIAL

## 2024-01-22 PROCEDURE — 97140 MANUAL THERAPY 1/> REGIONS: CPT | Performed by: PHYSICAL THERAPIST

## 2024-01-22 PROCEDURE — 97110 THERAPEUTIC EXERCISES: CPT | Performed by: PHYSICAL THERAPIST
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
## 2024-01-29 ENCOUNTER — APPOINTMENT (OUTPATIENT)
Dept: PHYSICAL THERAPY | Facility: HOSPITAL | Age: 24
End: 2024-01-29
Attending: PHYSICAL MEDICINE & REHABILITATION
Payer: COMMERCIAL
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
## 2024-02-06 ENCOUNTER — TELEPHONE (OUTPATIENT)
Dept: INTERNAL MEDICINE CLINIC | Facility: CLINIC | Age: 24
End: 2024-02-06

## 2024-02-06 NOTE — TELEPHONE ENCOUNTER
Spoke with pt,  verified, pt had Tele[facility] yesterday.   Pt stated angel called her and still has her maiden name on her meds.   Pt was advised to provide angel her new ID, confirmed pt her full name on file, everything was OK.   Pt stated understanding.   No further action needed.       SHANTI

## 2024-02-26 ENCOUNTER — APPOINTMENT (OUTPATIENT)
Dept: PHYSICAL THERAPY | Facility: HOSPITAL | Age: 24
End: 2024-02-26
Attending: PHYSICAL MEDICINE & REHABILITATION
Payer: COMMERCIAL

## 2024-03-11 ENCOUNTER — OFFICE VISIT (OUTPATIENT)
Dept: INTERNAL MEDICINE CLINIC | Facility: CLINIC | Age: 24
End: 2024-03-11

## 2024-03-11 VITALS
DIASTOLIC BLOOD PRESSURE: 85 MMHG | WEIGHT: 150 LBS | HEIGHT: 65 IN | BODY MASS INDEX: 24.99 KG/M2 | SYSTOLIC BLOOD PRESSURE: 123 MMHG | HEART RATE: 94 BPM

## 2024-03-11 DIAGNOSIS — E55.9 VITAMIN D DEFICIENCY: ICD-10-CM

## 2024-03-11 DIAGNOSIS — R53.83 FATIGUE, UNSPECIFIED TYPE: ICD-10-CM

## 2024-03-11 DIAGNOSIS — S03.40XA SPRAIN OF TEMPOROMANDIBULAR JOINT, INITIAL ENCOUNTER: ICD-10-CM

## 2024-03-11 DIAGNOSIS — Z00.00 PHYSICAL EXAM, ANNUAL: Primary | ICD-10-CM

## 2024-03-11 DIAGNOSIS — Z01.419 ENCOUNTER FOR ROUTINE GYNECOLOGICAL EXAMINATION WITH PAPANICOLAOU SMEAR OF CERVIX: ICD-10-CM

## 2024-03-11 RX ORDER — CYCLOBENZAPRINE HCL 10 MG
10 TABLET ORAL NIGHTLY PRN
Qty: 20 TABLET | Refills: 1 | Status: SHIPPED | OUTPATIENT
Start: 2024-03-11
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
## 2024-03-15 ENCOUNTER — OFFICE VISIT (OUTPATIENT)
Dept: INTERNAL MEDICINE CLINIC | Facility: CLINIC | Age: 24
End: 2024-03-15

## 2024-03-15 VITALS
TEMPERATURE: 98 F | RESPIRATION RATE: 10 BRPM | DIASTOLIC BLOOD PRESSURE: 79 MMHG | WEIGHT: 152 LBS | OXYGEN SATURATION: 100 % | SYSTOLIC BLOOD PRESSURE: 120 MMHG | HEART RATE: 76 BPM | BODY MASS INDEX: 25 KG/M2

## 2024-03-15 DIAGNOSIS — J02.9 SORE THROAT: Primary | ICD-10-CM

## 2024-03-15 DIAGNOSIS — J00 ACUTE NASOPHARYNGITIS: ICD-10-CM

## 2024-03-15 LAB
CONTROL LINE PRESENT WITH A CLEAR BACKGROUND (YES/NO): YES YES/NO
KIT LOT #: NORMAL NUMERIC
STREP GRP A CUL-SCR: NEGATIVE

## 2024-03-15 PROCEDURE — 3078F DIAST BP <80 MM HG: CPT | Performed by: INTERNAL MEDICINE

## 2024-03-15 PROCEDURE — 87880 STREP A ASSAY W/OPTIC: CPT | Performed by: INTERNAL MEDICINE

## 2024-03-15 PROCEDURE — 99213 OFFICE O/P EST LOW 20 MIN: CPT | Performed by: INTERNAL MEDICINE

## 2024-03-15 PROCEDURE — 3074F SYST BP LT 130 MM HG: CPT | Performed by: INTERNAL MEDICINE

## 2024-03-15 RX ORDER — AZITHROMYCIN 250 MG/1
TABLET, FILM COATED ORAL
Qty: 6 TABLET | Refills: 0 | Status: SHIPPED | OUTPATIENT
Start: 2024-03-15 | End: 2024-03-20
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
## 2024-03-18 ENCOUNTER — LAB ENCOUNTER (OUTPATIENT)
Dept: LAB | Age: 24
End: 2024-03-18
Attending: INTERNAL MEDICINE
Payer: COMMERCIAL

## 2024-03-18 DIAGNOSIS — E55.9 VITAMIN D DEFICIENCY: ICD-10-CM

## 2024-03-18 DIAGNOSIS — Z00.00 PHYSICAL EXAM, ANNUAL: ICD-10-CM

## 2024-03-18 DIAGNOSIS — R53.83 FATIGUE, UNSPECIFIED TYPE: ICD-10-CM

## 2024-03-18 LAB
ALBUMIN SERPL-MCNC: 4.4 G/DL (ref 3.2–4.8)
ALBUMIN/GLOB SERPL: 1.5 {RATIO} (ref 1–2)
ALP LIVER SERPL-CCNC: 38 U/L
ALT SERPL-CCNC: 10 U/L
ANION GAP SERPL CALC-SCNC: 2 MMOL/L (ref 0–18)
AST SERPL-CCNC: 18 U/L (ref ?–34)
BILIRUB SERPL-MCNC: 0.4 MG/DL (ref 0.3–1.2)
BUN BLD-MCNC: 11 MG/DL (ref 9–23)
BUN/CREAT SERPL: 16.4 (ref 10–20)
CALCIUM BLD-MCNC: 9.3 MG/DL (ref 8.7–10.4)
CHLORIDE SERPL-SCNC: 107 MMOL/L (ref 98–112)
CHOLEST SERPL-MCNC: 148 MG/DL (ref ?–200)
CO2 SERPL-SCNC: 30 MMOL/L (ref 21–32)
CREAT BLD-MCNC: 0.67 MG/DL
DEPRECATED HBV CORE AB SER IA-ACNC: 48.9 NG/ML
DEPRECATED RDW RBC AUTO: 38.6 FL (ref 35.1–46.3)
EGFRCR SERPLBLD CKD-EPI 2021: 125 ML/MIN/1.73M2 (ref 60–?)
ERYTHROCYTE [DISTWIDTH] IN BLOOD BY AUTOMATED COUNT: 11.9 % (ref 11–15)
FASTING PATIENT LIPID ANSWER: YES
FASTING STATUS PATIENT QL REPORTED: YES
FOLATE SERPL-MCNC: >24 NG/ML (ref 5.4–?)
GLOBULIN PLAS-MCNC: 3 G/DL (ref 2.8–4.4)
GLUCOSE BLD-MCNC: 87 MG/DL (ref 70–99)
HCT VFR BLD AUTO: 41.6 %
HDLC SERPL-MCNC: 34 MG/DL (ref 40–59)
HGB BLD-MCNC: 14 G/DL
IRON SATN MFR SERPL: 45 %
IRON SERPL-MCNC: 124 UG/DL
LDLC SERPL CALC-MCNC: 99 MG/DL (ref ?–100)
MCH RBC QN AUTO: 29.5 PG (ref 26–34)
MCHC RBC AUTO-ENTMCNC: 33.7 G/DL (ref 31–37)
MCV RBC AUTO: 87.8 FL
NONHDLC SERPL-MCNC: 114 MG/DL (ref ?–130)
OSMOLALITY SERPL CALC.SUM OF ELEC: 287 MOSM/KG (ref 275–295)
PLATELET # BLD AUTO: 251 10(3)UL (ref 150–450)
POTASSIUM SERPL-SCNC: 4.1 MMOL/L (ref 3.5–5.1)
PROT SERPL-MCNC: 7.4 G/DL (ref 5.7–8.2)
RBC # BLD AUTO: 4.74 X10(6)UL
SODIUM SERPL-SCNC: 139 MMOL/L (ref 136–145)
TIBC SERPL-MCNC: 273 UG/DL (ref 250–425)
TRANSFERRIN SERPL-MCNC: 183 MG/DL (ref 250–380)
TRIGL SERPL-MCNC: 78 MG/DL (ref 30–149)
TSI SER-ACNC: 0.84 MIU/ML (ref 0.55–4.78)
VIT B12 SERPL-MCNC: 962 PG/ML (ref 211–911)
VIT D+METAB SERPL-MCNC: 38.1 NG/ML (ref 30–100)
VLDLC SERPL CALC-MCNC: 13 MG/DL (ref 0–30)
WBC # BLD AUTO: 4.2 X10(3) UL (ref 4–11)

## 2024-03-18 PROCEDURE — 82607 VITAMIN B-12: CPT

## 2024-03-18 PROCEDURE — 85027 COMPLETE CBC AUTOMATED: CPT

## 2024-03-18 PROCEDURE — 82306 VITAMIN D 25 HYDROXY: CPT

## 2024-03-18 PROCEDURE — 80053 COMPREHEN METABOLIC PANEL: CPT

## 2024-03-18 PROCEDURE — 80061 LIPID PANEL: CPT

## 2024-03-18 PROCEDURE — 82746 ASSAY OF FOLIC ACID SERUM: CPT

## 2024-03-18 PROCEDURE — 84466 ASSAY OF TRANSFERRIN: CPT

## 2024-03-18 PROCEDURE — 84443 ASSAY THYROID STIM HORMONE: CPT

## 2024-03-18 PROCEDURE — 36415 COLL VENOUS BLD VENIPUNCTURE: CPT

## 2024-03-18 PROCEDURE — 83540 ASSAY OF IRON: CPT

## 2024-03-18 PROCEDURE — 82728 ASSAY OF FERRITIN: CPT

## 2024-06-17 ENCOUNTER — OFFICE VISIT (OUTPATIENT)
Dept: PHYSICAL MEDICINE AND REHAB | Facility: CLINIC | Age: 24
End: 2024-06-17
Payer: COMMERCIAL

## 2024-06-17 VITALS — BODY MASS INDEX: 24.99 KG/M2 | HEIGHT: 65 IN | WEIGHT: 150 LBS

## 2024-06-17 DIAGNOSIS — M51.26 LUMBAR HERNIATED DISC: ICD-10-CM

## 2024-06-17 DIAGNOSIS — M54.12 CERVICAL RADICULOPATHY: ICD-10-CM

## 2024-06-17 DIAGNOSIS — M51.9 THORACIC DISC DISEASE: ICD-10-CM

## 2024-06-17 DIAGNOSIS — M54.2 NECK PAIN: ICD-10-CM

## 2024-06-17 DIAGNOSIS — M50.90 CERVICAL DISC DISEASE: Primary | ICD-10-CM
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
## 2024-06-18 ENCOUNTER — OFFICE VISIT (OUTPATIENT)
Dept: INTERNAL MEDICINE CLINIC | Facility: CLINIC | Age: 24
End: 2024-06-18

## 2024-06-18 VITALS
HEIGHT: 65 IN | OXYGEN SATURATION: 100 % | HEART RATE: 79 BPM | WEIGHT: 154.5 LBS | BODY MASS INDEX: 25.74 KG/M2 | SYSTOLIC BLOOD PRESSURE: 112 MMHG | DIASTOLIC BLOOD PRESSURE: 77 MMHG

## 2024-06-18 DIAGNOSIS — R20.2 NUMBNESS AND TINGLING IN BOTH HANDS: ICD-10-CM

## 2024-06-18 DIAGNOSIS — R20.0 NUMBNESS AND TINGLING IN BOTH HANDS: ICD-10-CM

## 2024-06-18 DIAGNOSIS — M25.532 PAIN IN BOTH WRISTS: Primary | ICD-10-CM

## 2024-06-18 DIAGNOSIS — M25.531 PAIN IN BOTH WRISTS: Primary | ICD-10-CM

## 2024-06-18 PROCEDURE — 3074F SYST BP LT 130 MM HG: CPT | Performed by: INTERNAL MEDICINE

## 2024-06-18 PROCEDURE — 3008F BODY MASS INDEX DOCD: CPT | Performed by: INTERNAL MEDICINE

## 2024-06-18 PROCEDURE — 3078F DIAST BP <80 MM HG: CPT | Performed by: INTERNAL MEDICINE

## 2024-06-18 PROCEDURE — 99214 OFFICE O/P EST MOD 30 MIN: CPT | Performed by: INTERNAL MEDICINE
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
## 2024-06-22 ENCOUNTER — PATIENT MESSAGE (OUTPATIENT)
Dept: INTERNAL MEDICINE CLINIC | Facility: CLINIC | Age: 24
End: 2024-06-22

## 2024-06-22 DIAGNOSIS — R20.0 NUMBNESS AND TINGLING IN BOTH HANDS: Primary | ICD-10-CM

## 2024-06-22 DIAGNOSIS — R20.2 NUMBNESS AND TINGLING IN BOTH HANDS: Primary | ICD-10-CM

## 2024-06-22 DIAGNOSIS — M25.532 PAIN IN BOTH WRISTS: ICD-10-CM

## 2024-06-22 DIAGNOSIS — M25.531 PAIN IN BOTH WRISTS: ICD-10-CM

## 2024-06-23 NOTE — TELEPHONE ENCOUNTER
Dr. Robles, please see follow up message from 6/18/24 appointment.     From: Huma Shields  To: Rissa Robles  Sent: 6/22/2024 10:57 AM CDT  Subject: Wrist pain follow up question     In regards to doing an X-ray on both of my wrists. I know you were thinking about doing one and I had talked with my mom and she had told me that my aunt ( her sister) had the same wrist pain as I’m experiencing when she was in her 20s. Both my mom and aunt had cysts in there wrists when they were younger  which I just found out. Would it be possible to get the X-rays just to see if I have something similar to what they had and rule anything out if there is nothing that shows up. Thanks.

## 2024-06-25 ENCOUNTER — HOSPITAL ENCOUNTER (OUTPATIENT)
Dept: GENERAL RADIOLOGY | Age: 24
Discharge: HOME | End: 2024-06-25
Attending: INTERNAL MEDICINE

## 2024-06-25 DIAGNOSIS — M25.531 PAIN IN BOTH WRISTS: ICD-10-CM

## 2024-06-25 DIAGNOSIS — R20.0 NUMBNESS AND TINGLING IN BOTH HANDS: ICD-10-CM

## 2024-06-25 DIAGNOSIS — M25.532 PAIN IN BOTH WRISTS: ICD-10-CM

## 2024-06-25 DIAGNOSIS — R20.2 NUMBNESS AND TINGLING IN BOTH HANDS: ICD-10-CM

## 2024-06-25 DIAGNOSIS — L98.9 THUMB LESION: Primary | ICD-10-CM

## 2024-06-25 DIAGNOSIS — R93.89 ABNORMAL FINDING ON IMAGING: ICD-10-CM

## 2024-06-25 PROCEDURE — 73110 X-RAY EXAM OF WRIST: CPT | Performed by: INTERNAL MEDICINE

## 2024-06-26 ENCOUNTER — PATIENT MESSAGE (OUTPATIENT)
Dept: INTERNAL MEDICINE CLINIC | Facility: CLINIC | Age: 24
End: 2024-06-26

## 2024-06-26 NOTE — TELEPHONE ENCOUNTER
From: Huma Shields  To: Rissa Robles  Sent: 6/26/2024  8:51 AM CDT  Subject: Xray follow up     I had surgery on my right thumb years ago. So that is the anchor they put in to attach my ligament back together.         Patient referring to Xray Wrist , Right result  Copied from  6/25/24 Result:     Dear Huma Shields,     Your test results are within normal limits except that by the thumb there is a small possible foreign body. Did you have any falls trauma injury or surgery there? We can consider doing a thumb xray to see if this would give us a better look . I have ordered this to be done at your convenience.  .  Please follow-up with our office if you have any questions or concerns.     Thank you.     Rissa Robles MD   Written by Rissa Robles MD on 6/25/2024 10:33 PM CDT  Seen by patient Huma Fishjahpat on 6/26/2024  8:49 AM

## 2024-06-26 NOTE — TELEPHONE ENCOUNTER
Dr. Robles - see [employer] Message, responding to Xray Result 6/25/24    Is Xray of the Thumb still recommended?     Please advise

## 2024-07-29 ENCOUNTER — PROCEDURE VISIT (OUTPATIENT)
Dept: PHYSICAL MEDICINE AND REHAB | Facility: CLINIC | Age: 24
End: 2024-07-29
Payer: COMMERCIAL

## 2024-07-29 DIAGNOSIS — R20.0 NUMBNESS AND TINGLING IN BOTH HANDS: Primary | ICD-10-CM

## 2024-07-29 DIAGNOSIS — R20.2 NUMBNESS AND TINGLING IN BOTH HANDS: Primary | ICD-10-CM

## 2024-07-29 NOTE — PROCEDURES
[address]  [address]  [place] [address]  Phone: [phone]  Fax: [phone]    ELECTRODIAGNOSTIC REPORT  Patient: BRAYDEN THOMAS Hand Dominance:  RIGHT   Patient ID: [id number] Referring Dr:  Rissa Robles MD   Sex: Female Test Dr:  Cesar Crook DO   YOB: 2000           Visit Date: 24 Years Examining MD:     Age: 24 Years Referred by:     Height: 5 feet 5 inch     Referred for: 24 year old RH female presents with intermittent, daily numbness and tingling for a few years, with newer onset pain in the wrist radiating up the forearm. The numbness and tingling involves all 5 fingers bilaterally, and occasionally drops objects. The pain in the wrists began in January of this year. Has been wearing wrist braces at night time since Feburary.     PMH: No DM, thyroid disease, EtOH abuse    Phyiscal exam:  normal muscle bulk both hands  2/4 BUE reflexes  SILT m/r/u distributions of both hands  Tinel's test over the wrists negative b/l     Summary    The motor conduction test was normal in all 4 of the tested nerves: R Median - APB, L Median - APB, R Ulnar - ADM, L Ulnar - ADM.    The sensory conduction test was normal in all 8 of the tested nerves: R Median - Digit II (Antidromic), L Median - Digit II (Antidromic), R Ulnar - Digit V (Antidromic), L Ulnar - Digit V (Antidromic), R Radial - Anatomical snuff box (Forearm), L Radial - Anatomical snuff box (Forearm), R Median, Ulnar - Transcarpal comparison, L Median, Ulnar - Transcarpal comparison.    The needle EMG study was normal in all 10 tested muscles: R. Deltoid, R. Biceps brachii, R. Triceps brachii, R. Pronator teres, R. First dorsal interosseous, L. Deltoid, L. Biceps brachii, L. Triceps brachii, L. Pronator teres, L. First dorsal interosseous.      Conclusion:   This is a normal electrodiagnostic study. There is no electrodiagnostic evidence of mononeuropathy, brachial plexopathy, cervical radiculopathy  or myopathy in the bilateral upper extremities.     Thank you for allowing me to participate in this patient's care,    Cesar Crook DO  Physical Medicine and Rehabilitation  [facility]  ________________________________    Motor NCS      Nerve / Sites Muscle Latency Amplitude Amp % Duration Segments Distance Lat Diff Velocity     ms mV % ms  cm ms m/s   R Median - APB      Wrist APB 2.90 12.6 100 6.98 Wrist - APB 8        Ref.  ?4.40 ?4.0 ?100  Ref.         Elbow APB 6.77 12.7 101 7.13 Elbow - Wrist 22 3.88 57      Ref.    ?80  Ref.   ?49   L Median - APB      Wrist APB 3.08 13.2 100 5.27 Wrist - APB 8        Ref.  ?4.40 ?4.0 ?100  Ref.         Elbow APB 6.65 13.2 100 5.48 Elbow - Wrist 21 3.56 59      Ref.    ?80  Ref.   ?49   R Ulnar - ADM      Wrist ADM 2.52 10.2 100 6.81 Wrist - ADM 8        Ref.  ?3.90 ?5.0 ?100  Ref.         B.Elbow ADM 6.00 9.4 92.2 6.94 B.Elbow - Wrist 21.5 3.48 62      Ref.    ?80  Ref.   ?50      A.Elbow ADM 7.56 8.7 86.2 7.06 A.Elbow - B.Elbow 8 1.56 51      Ref.      Ref.   ?50   L Ulnar - ADM      Wrist ADM 2.35 9.4 100 6.35 Wrist - ADM 8        Ref.  ?3.90 ?5.0 ?100  Ref.         B.Elbow ADM 5.77 8.9 95.5 6.94 B.Elbow - Wrist 21 3.42 61      Ref.    ?80  Ref.   ?50      A.Elbow ADM 7.42 8.3 88.6 7.33 A.Elbow - B.Elbow 9.5 1.65 58      Ref.      Ref.   ?50       Sensory NCS      Nerve / Sites Rec. Site Onset Lat Peak Lat NP Amp PP Amp Segments Distance Peak Diff Velocity     ms ms µV µV  cm ms m/s   R Median - Digit II (Antidromic)      Wrist Dig II 2.31 2.90 48.5 81.2 Wrist - Dig II 14  61      Ref.   ?3.40 ?15.0 ?20.0 Ref.      L Median - Digit II (Antidromic)      Wrist Dig II 2.40 3.04 48.5 75.4 Wrist - Dig II 13  54      Ref.   ?3.40 ?15.0 ?20.0 Ref.      R Ulnar - Digit V (Antidromic)      Wrist Dig V 2.33 2.98 39.7 59.4 Wrist - Dig V 14  60      Ref.   ?3.70 ?15.0 ?15.0 Ref.      L Ulnar - Digit V (Antidromic)      Wrist Dig V 2.40 3.02 35.9 53.7 Wrist -  Dig V 14  58      Ref.   ?3.70 ?15.0 ?15.0 Ref.      R Radial - Anatomical snuff box (Forearm)      Forearm Wrist 1.69 2.19 39.5 34.2 Forearm - Wrist 10  59      Ref.   ?2.70 ?5.0 ?5.0 Ref.      L Radial - Anatomical snuff box (Forearm)      Forearm Wrist 1.67 2.19 39.7 34.2 Forearm - Wrist 10  60      Ref.   ?2.70 ?5.0 ?5.0 Ref.      R Median, Ulnar - Transcarpal comparison      Median Palm Wrist 1.27 1.77 52.7 59.7 Median Palm - Wrist 8  63      Ulnar Palm Wrist 1.27 1.75 41.1 40.6 Ulnar Palm - Wrist 8  63         Median Palm - Ulnar Palm  0.02          Ref.  ?0.40    L Median, Ulnar - Transcarpal comparison      Median Palm Wrist 1.15 1.65 47.6 54.5 Median Palm - Wrist 8  70      Ulnar Palm Wrist 1.04 1.67 45.5 39.7 Ulnar Palm - Wrist 8  77         Median Palm - Ulnar Palm  -0.02          Ref.  ?0.40        EMG Summary Table     Spontaneous MUAP Recruitment   Muscle Nerve Roots IA Fib PSW Fasc H.F. Comments Amp Dur. PPP Pattern   R. Deltoid Axillary C5-C6 N None None None None Normal N N N N   R. Biceps brachii Musculocutaneous C5-C6 N None None None None Normal N N N N   R. Triceps brachii Radial C6-C8 N None None None None Normal N N N N   R. Pronator teres Median C6-C7 N None None None None Normal N N N N   R. First dorsal interosseous Ulnar C8-T1 N None None None None Normal N N N N   L. Deltoid Axillary C5-C6 N None None None None Normal N N N N   L. Biceps brachii Musculocutaneous C5-C6 N None None None None Normal N N N N   L. Triceps brachii Radial C6-C8 N None None None None Normal N N N N   L. Pronator teres Median C6-C7 N None None None None Normal N N N N   L. First dorsal interosseous Ulnar C8-T1 N None None None None Normal N N N N

## 2024-08-23 ENCOUNTER — TELEPHONE (OUTPATIENT)
Dept: PHYSICAL THERAPY | Facility: HOSPITAL | Age: 24
End: 2024-08-23

## 2024-08-26 ENCOUNTER — OFFICE VISIT (OUTPATIENT)
Dept: INTERNAL MEDICINE CLINIC | Facility: CLINIC | Age: 24
End: 2024-08-26

## 2024-08-26 ENCOUNTER — APPOINTMENT (OUTPATIENT)
Dept: PHYSICAL THERAPY | Facility: HOSPITAL | Age: 24
End: 2024-08-26
Attending: PHYSICAL MEDICINE & REHABILITATION
Payer: COMMERCIAL

## 2024-08-26 ENCOUNTER — NURSE TRIAGE (OUTPATIENT)
Dept: INTERNAL MEDICINE CLINIC | Facility: CLINIC | Age: 24
End: 2024-08-26

## 2024-08-26 VITALS
SYSTOLIC BLOOD PRESSURE: 102 MMHG | DIASTOLIC BLOOD PRESSURE: 72 MMHG | OXYGEN SATURATION: 99 % | HEIGHT: 65 IN | HEART RATE: 75 BPM | WEIGHT: 154 LBS | BODY MASS INDEX: 25.66 KG/M2 | TEMPERATURE: 98 F

## 2024-08-26 DIAGNOSIS — R21 RASH: Primary | ICD-10-CM

## 2024-08-26 PROCEDURE — 3078F DIAST BP <80 MM HG: CPT | Performed by: INTERNAL MEDICINE

## 2024-08-26 PROCEDURE — 3008F BODY MASS INDEX DOCD: CPT | Performed by: INTERNAL MEDICINE

## 2024-08-26 PROCEDURE — 3074F SYST BP LT 130 MM HG: CPT | Performed by: INTERNAL MEDICINE

## 2024-08-26 PROCEDURE — 99214 OFFICE O/P EST MOD 30 MIN: CPT | Performed by: INTERNAL MEDICINE

## 2024-08-26 RX ORDER — PREDNISONE 20 MG/1
20 TABLET ORAL DAILY
Qty: 3 TABLET | Refills: 0 | Status: SHIPPED | OUTPATIENT
Start: 2024-08-26

## 2024-08-26 RX ORDER — PREDNISONE 20 MG/1
20 TABLET ORAL DAILY
Qty: 3 TABLET | Refills: 0 | Status: SHIPPED | OUTPATIENT
Start: 2024-08-26 | End: 2024-08-26
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
## 2024-08-26 NOTE — TELEPHONE ENCOUNTER
Action Requested: Summary for Provider     []  Critical Lab, Recommendations Needed  [] Need Additional Advice  []   FYI    []   Need Orders  [] Need Medications Sent to Pharmacy  [x]  Other     SUMMARY:   Verified name and .    Patient reports that last night rash started- now is widespread to entire body- small red dots.    She denies any difficulty breathing, difficulty swallowing, tongue swelling, or fevers at this time.    She states that she has also had headaches over the past couple of days.    Per protocol, advised that patient be evaluated today.    Appointment scheduled:  Future Appointments   Date Time Provider Department Center   2024 11:00 AM Jennifer Arango, PT [facility] NEURO PT EM [facility]   2024  1:15 PM Vito Ross MD [facility]   2024  3:00 PM Jennifer Arango, PT [facility] NEURO PT EM [facility]   2024  3:30 PM Jannette Rush MD [facility]   2024  8:45 AM Jennifer Arango, PT [facility] NEURO PT EM [facility]   2024  8:45 AM Jennifer Arango, PT [facility] NEURO PT EM [facility]   10/11/2024  3:00 PM Jennifer Arango, PT [facility] NEURO PT EM [facility]   10/18/2024  3:00 PM Jennifer Arango, PT [facility] NEURO PT EM [facility]   10/25/2024  3:00 PM Jennifer Arango, PT [facility] NEURO PT EM [facility]   2024  3:00 PM Jennifer Arango, PT [facility] NEURO PT EM [facility]   2024  9:00 AM Brian Christy MD [facility]   3/17/2025  9:00 AM Rissa Robles MD [facility]     Information provided:  [address]    Patient was advised to go to the emergency room if any difficulty breathing or difficulty swallowing develops. Patient verbalizes understanding and agrees with plan.    Reason for call: Acute  Onset: Data Unavailable    Reason for Disposition   Headache    Protocols used: Rash or Redness - Widespread-A-OH

## 2024-08-27 ENCOUNTER — NURSE TRIAGE (OUTPATIENT)
Dept: INTERNAL MEDICINE CLINIC | Facility: CLINIC | Age: 24
End: 2024-08-27

## 2024-08-27 NOTE — TELEPHONE ENCOUNTER
Patient saw Dr Hannah Ross yesterday for a rash all over her body and a headache since Friday 8/23/2024. Patient took excedrin but it did not help with the headache. Started the prednisone. This morning at 4am patient started with a fever of 101.5F. 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 Concerned since now has a fever along with the rash and headache. Please advise.

## 2024-08-27 NOTE — TELEPHONE ENCOUNTER
Advised patient of Dr. East's note. Patient verbalized understanding. Out in [place] so will go to emergency room near her.

## 2024-08-28 NOTE — TELEPHONE ENCOUNTER
In emergency department     Encounter Details    Encounter Details  Date Type Department Care Team (Latest Contact Info) Description   08/27/2024 11:04 AM CDT - 08/27/2024 12:57 PM CDT Emergency [facility]   [address] [place] [address] [phone]

## 2024-09-06 ENCOUNTER — APPOINTMENT (OUTPATIENT)
Dept: PHYSICAL THERAPY | Facility: HOSPITAL | Age: 24
End: 2024-09-06
Attending: PHYSICAL MEDICINE & REHABILITATION
Payer: COMMERCIAL

## 2024-09-10 ENCOUNTER — PATIENT MESSAGE (OUTPATIENT)
Dept: INTERNAL MEDICINE CLINIC | Facility: CLINIC | Age: 24
End: 2024-09-10

## 2024-09-10 ENCOUNTER — PATIENT MESSAGE (OUTPATIENT)
Dept: PHYSICAL MEDICINE AND REHAB | Facility: CLINIC | Age: 24
End: 2024-09-10

## 2024-09-10 DIAGNOSIS — Z23 NEED FOR VACCINATION: Primary | ICD-10-CM

## 2024-09-11 NOTE — TELEPHONE ENCOUNTER
DR Rissa Robles=please see message, do you recommend Men B vaccine ? Please order .     OFFICE STAFF=please check if we have it in  stock.       Immunization record;  Meningococcal-Menactra 2/1/2016, 8/7/2010         PER CDC;  Meningococcal A, C, W, Y  (MenACWY) more info icon. 1 or 2 doses depending on indication, See notes for booster recommendations  Meningococcal B  (MenB) more info icon.  2 or 3 doses depending on vaccine and indication, See notes for booster recommendations  19 through 23 years        From: Huma Shields  To: Rissa Robles  Sent: 9/10/2024 10:13 AM CDT  Subject: Meningitis Vaccine    I am interested in getting the meningitis vaccine. With having it viral during [place] and most recent having similar symptoms. The ER doctor let me know that I am at a higher risk that I could get it again. I wanted to see if you would recommend getting the vaccine for it. Thanks!

## 2024-09-12 NOTE — TELEPHONE ENCOUNTER
Sent patient [facility] recommending scheduling f/u with Dr. Christy.    LOV: 6/17/2024 w/ Dr. Christy  LOV PLAN:   \"Plan  She will start using a posture  brace in the afternoons to help with her symptoms.     The patient will continue with her home exercise program.     She will continue with her Pilates.     She will follow up in 2-3 months or sooner if needed.     I will hold on doing the C7-T1 ILESI for now.\"      From: Huma Shields  To: Brian Christy  Sent: 9/10/2024 10:22 AM CDT  Subject: Physical Therapy    From the last we talked in June I believed you had told me to start back at therapy. I just started last week since I had to wait to get in but there was no order for me to be in physical therapy.  Can you put an order in for that?  Also, I have been getting more numbness and tingling on the left side of my neck  that will go down my arm. As well as both of my hands specifically my little finger and ring finger goes numb. Is it possible to be able to get another MRI done on my neck to see if my discs have gotten worse.  Thanks!

## 2024-09-13 ENCOUNTER — TELEPHONE (OUTPATIENT)
Dept: PHYSICAL MEDICINE AND REHAB | Facility: CLINIC | Age: 24
End: 2024-09-13

## 2024-09-13 DIAGNOSIS — M50.90 CERVICAL DISC DISEASE: ICD-10-CM

## 2024-09-13 DIAGNOSIS — M47.894 THORACIC FACET JOINT SYNDROME: ICD-10-CM

## 2024-09-13 DIAGNOSIS — M54.12 CERVICAL RADICULOPATHY: Primary | ICD-10-CM

## 2024-09-13 DIAGNOSIS — M51.9 THORACIC DISC DISEASE: ICD-10-CM

## 2024-09-16 NOTE — TELEPHONE ENCOUNTER
Patient returned call and added to nurse schedule at [facility].  Verified they have vaccine.

## 2024-09-20 ENCOUNTER — TELEPHONE (OUTPATIENT)
Dept: RHEUMATOLOGY | Facility: CLINIC | Age: 24
End: 2024-09-20

## 2024-09-20 ENCOUNTER — PATIENT MESSAGE (OUTPATIENT)
Dept: RHEUMATOLOGY | Facility: CLINIC | Age: 24
End: 2024-09-20

## 2024-09-20 ENCOUNTER — OFFICE VISIT (OUTPATIENT)
Dept: RHEUMATOLOGY | Facility: CLINIC | Age: 24
End: 2024-09-20
Payer: COMMERCIAL

## 2024-09-20 VITALS
DIASTOLIC BLOOD PRESSURE: 78 MMHG | SYSTOLIC BLOOD PRESSURE: 119 MMHG | HEART RATE: 73 BPM | BODY MASS INDEX: 26 KG/M2 | WEIGHT: 154.5 LBS

## 2024-09-20 DIAGNOSIS — G89.29 CHRONIC BILATERAL LOW BACK PAIN WITHOUT SCIATICA: ICD-10-CM

## 2024-09-20 DIAGNOSIS — M79.10 MYALGIA: ICD-10-CM

## 2024-09-20 DIAGNOSIS — M25.531 BILATERAL WRIST PAIN: ICD-10-CM

## 2024-09-20 DIAGNOSIS — M54.50 CHRONIC BILATERAL LOW BACK PAIN WITHOUT SCIATICA: ICD-10-CM

## 2024-09-20 DIAGNOSIS — M25.532 BILATERAL WRIST PAIN: ICD-10-CM

## 2024-09-20 DIAGNOSIS — M25.50 POLYARTHRALGIA: Primary | ICD-10-CM

## 2024-09-20 DIAGNOSIS — M25.532 LEFT WRIST PAIN: ICD-10-CM

## 2024-09-20 PROCEDURE — 99204 OFFICE O/P NEW MOD 45 MIN: CPT | Performed by: INTERNAL MEDICINE

## 2024-09-20 PROCEDURE — 3074F SYST BP LT 130 MM HG: CPT | Performed by: INTERNAL MEDICINE

## 2024-09-20 PROCEDURE — 3078F DIAST BP <80 MM HG: CPT | Performed by: INTERNAL MEDICINE

## 2024-09-20 NOTE — PATIENT INSTRUCTIONS
Check labs today   Ibuprofen as needed   Check mri left wrist with and without contrast call [phone]  Return to clinic in 1month. 11/4th at 12:40 pm
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
## 2024-09-21 NOTE — TELEPHONE ENCOUNTER
From: Huma Shields  To: Jannette Rush  Sent: 9/20/2024 4:48 PM CDT  Subject: Following up about history of autoimmune     I wanted to let you know because I just talked with my mom and she has Hashimoto and osteo arthritis.

## 2024-09-21 NOTE — TELEPHONE ENCOUNTER
Please advise.    LOV: 9/20/24  Future Appointments   Date Time Provider Department Center   9/23/2024  8:45 AM Jennifer Arango, PT [facility] NEURO PT EM [facility]   9/23/2024 10:00 AM EC ECM IM RN [facility] Jesus   9/30/2024  8:45 AM Jennifer Arango, PT [facility] NEURO PT EM [facility]   10/11/2024  3:00 PM Jennifer Arango, PT [facility] NEURO PT EM [facility]   10/18/2024  3:00 PM Jennifer Arango, PT [facility] NEURO PT EM [facility]   10/18/2024  5:00 PM [facility] MRI RM1 (1.5T WIDE) [facility] MRI EM [place]   10/25/2024  3:00 PM Jennifer Arango, PT [facility] NEURO PT EM [facility]   11/1/2024  3:00 PM Jennifer Arango, PT [facility] NEURO PT EM [facility]   12/2/2024  9:00 AM Brian Christy MD PM&R Y[facility]   3/17/2025  9:00 AM Rissa Robles MD [facility]     Labs:       Instructions    Check labs today   Ibuprofen as needed   Check mri left wrist with and without contrast call [phone]  Return to clinic in 1month. 11/4th at 12:40 pm

## 2024-09-23 ENCOUNTER — NURSE ONLY (OUTPATIENT)
Dept: INTERNAL MEDICINE CLINIC | Facility: CLINIC | Age: 24
End: 2024-09-23

## 2024-09-23 ENCOUNTER — OFFICE VISIT (OUTPATIENT)
Dept: PHYSICAL THERAPY | Facility: HOSPITAL | Age: 24
End: 2024-09-23
Attending: PHYSICAL MEDICINE & REHABILITATION
Payer: COMMERCIAL

## 2024-09-23 ENCOUNTER — LAB ENCOUNTER (OUTPATIENT)
Dept: LAB | Facility: HOSPITAL | Age: 24
End: 2024-09-23
Attending: INTERNAL MEDICINE
Payer: COMMERCIAL

## 2024-09-23 DIAGNOSIS — M25.50 POLYARTHRALGIA: ICD-10-CM

## 2024-09-23 DIAGNOSIS — M79.10 MYALGIA: ICD-10-CM

## 2024-09-23 DIAGNOSIS — Z23 NEED FOR MENINGOCOCCAL VACCINATION: Primary | ICD-10-CM

## 2024-09-23 DIAGNOSIS — G89.29 CHRONIC BILATERAL LOW BACK PAIN WITHOUT SCIATICA: ICD-10-CM

## 2024-09-23 DIAGNOSIS — M54.50 CHRONIC BILATERAL LOW BACK PAIN WITHOUT SCIATICA: ICD-10-CM

## 2024-09-23 LAB
CK SERPL-CCNC: 32 U/L
CRP SERPL-MCNC: <0.4 MG/DL (ref ?–1)
ERYTHROCYTE [SEDIMENTATION RATE] IN BLOOD: 9 MM/HR
RHEUMATOID FACT SERPL-ACNC: <3.5 IU/ML (ref ?–14)

## 2024-09-23 PROCEDURE — 85652 RBC SED RATE AUTOMATED: CPT

## 2024-09-23 PROCEDURE — 36415 COLL VENOUS BLD VENIPUNCTURE: CPT

## 2024-09-23 PROCEDURE — 97162 PT EVAL MOD COMPLEX 30 MIN: CPT | Performed by: PHYSICAL THERAPIST

## 2024-09-23 PROCEDURE — 86431 RHEUMATOID FACTOR QUANT: CPT

## 2024-09-23 PROCEDURE — 86140 C-REACTIVE PROTEIN: CPT

## 2024-09-23 PROCEDURE — 82085 ASSAY OF ALDOLASE: CPT

## 2024-09-23 PROCEDURE — 90471 IMMUNIZATION ADMIN: CPT | Performed by: INTERNAL MEDICINE

## 2024-09-23 PROCEDURE — 86225 DNA ANTIBODY NATIVE: CPT

## 2024-09-23 PROCEDURE — 86038 ANTINUCLEAR ANTIBODIES: CPT

## 2024-09-23 PROCEDURE — 82550 ASSAY OF CK (CPK): CPT

## 2024-09-23 PROCEDURE — 97112 NEUROMUSCULAR REEDUCATION: CPT | Performed by: PHYSICAL THERAPIST

## 2024-09-23 PROCEDURE — 81374 HLA I TYPING 1 ANTIGEN LR: CPT

## 2024-09-23 PROCEDURE — 86039 ANTINUCLEAR ANTIBODIES (ANA): CPT

## 2024-09-23 PROCEDURE — 90734 MENACWYD/MENACWYCRM VACC IM: CPT | Performed by: INTERNAL MEDICINE

## 2024-09-23 PROCEDURE — 86200 CCP ANTIBODY: CPT
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
## 2024-09-23 NOTE — TELEPHONE ENCOUNTER
Done.      Future Appointments   Date Time Provider Department Center   9/23/2024  9:45 AM [facility]   9/23/2024 10:00 AM [facility] ECM IM RN [facility]   9/30/2024  8:45 AM Jennifer Arango, PT [facility] NEURO PT EM [facility]   10/11/2024  3:00 PM Jennifer Arango, PT [facility] NEURO PT EM [facility]   10/18/2024  3:00 PM Jennifer Arango, PT [facility] NEURO PT EM [facility]   10/18/2024  5:00 PM [facility] MRI RM1 (1.5T WIDE) [facility] MRI EM [place]   10/25/2024  3:00 PM Jennifer Arango, PT [facility] NEURO PT EM [facility]   11/1/2024  3:00 PM Jennifer Arango, PT [facility] NEURO PT EM [facility]   11/4/2024 12:40 PM Jannette Rush MD [facility]   12/2/2024  9:00 AM Brian Christy MD PM&R [facility]   3/17/2025  9:00 AM Rissa Robles MD [facility]

## 2024-09-24 LAB
ALDOLASE: 3.1 U/L
CCP IGG SERPL-ACNC: 1.3 U/ML (ref 0–6.9)
DSDNA IGG SERPL IA-ACNC: 13.6 IU/ML
ENA AB SER QL IA: 0.2 UG/L

## 2024-09-25 LAB
HLA B27 SCREENING: POSITIVE
HLA B27 SCREENING: POSITIVE

## 2024-09-26 LAB
ANA NUCLEOLAR TITR SER IF: 160 {TITER}
NUCLEAR IGG TITR SER IF: POSITIVE {TITER}

## 2024-09-27 ENCOUNTER — TELEPHONE (OUTPATIENT)
Dept: PHYSICAL THERAPY | Facility: HOSPITAL | Age: 24
End: 2024-09-27

## 2024-09-30 ENCOUNTER — OFFICE VISIT (OUTPATIENT)
Dept: PHYSICAL THERAPY | Facility: HOSPITAL | Age: 24
End: 2024-09-30
Attending: PHYSICAL MEDICINE & REHABILITATION
Payer: COMMERCIAL

## 2024-09-30 PROCEDURE — 97140 MANUAL THERAPY 1/> REGIONS: CPT | Performed by: PHYSICAL THERAPIST

## 2024-09-30 PROCEDURE — 97530 THERAPEUTIC ACTIVITIES: CPT | Performed by: PHYSICAL THERAPIST
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
## 2024-10-11 ENCOUNTER — APPOINTMENT (OUTPATIENT)
Dept: PHYSICAL THERAPY | Facility: HOSPITAL | Age: 24
End: 2024-10-11
Attending: PHYSICAL MEDICINE & REHABILITATION
Payer: COMMERCIAL

## 2024-10-18 ENCOUNTER — HOSPITAL ENCOUNTER (OUTPATIENT)
Dept: MRI IMAGING | Age: 24
Discharge: HOME | End: 2024-10-18
Attending: INTERNAL MEDICINE
Payer: COMMERCIAL

## 2024-10-18 ENCOUNTER — OFFICE VISIT (OUTPATIENT)
Dept: PHYSICAL THERAPY | Facility: HOSPITAL | Age: 24
End: 2024-10-18
Attending: PHYSICAL MEDICINE & REHABILITATION
Payer: COMMERCIAL

## 2024-10-18 DIAGNOSIS — M25.531 BILATERAL WRIST PAIN: ICD-10-CM

## 2024-10-18 DIAGNOSIS — M25.532 BILATERAL WRIST PAIN: ICD-10-CM

## 2024-10-18 DIAGNOSIS — M25.532 LEFT WRIST PAIN: ICD-10-CM

## 2024-10-18 PROCEDURE — 73223 MRI JOINT UPR EXTR W/O&W/DYE: CPT | Performed by: INTERNAL MEDICINE

## 2024-10-18 PROCEDURE — A9575 INJ GADOTERATE MEGLUMI 0.1ML: HCPCS | Performed by: INTERNAL MEDICINE

## 2024-10-18 PROCEDURE — 97140 MANUAL THERAPY 1/> REGIONS: CPT | Performed by: PHYSICAL THERAPIST

## 2024-10-18 RX ORDER — GADOTERATE MEGLUMINE 376.9 MG/ML
15 INJECTION INTRAVENOUS
Status: COMPLETED | OUTPATIENT
Start: 2024-10-18 | End: 2024-10-18

## 2024-10-18 RX ADMIN — GADOTERATE MEGLUMINE 14 ML: 376.9 INJECTION INTRAVENOUS at 17:34:00
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
## 2024-10-24 ENCOUNTER — TELEPHONE (OUTPATIENT)
Dept: PHYSICAL THERAPY | Facility: HOSPITAL | Age: 24
End: 2024-10-24

## 2024-10-25 ENCOUNTER — APPOINTMENT (OUTPATIENT)
Dept: PHYSICAL THERAPY | Facility: HOSPITAL | Age: 24
End: 2024-10-25
Attending: PHYSICAL MEDICINE & REHABILITATION
Payer: COMMERCIAL

## 2024-11-01 ENCOUNTER — APPOINTMENT (OUTPATIENT)
Dept: PHYSICAL THERAPY | Facility: HOSPITAL | Age: 24
End: 2024-11-01
Attending: PHYSICAL MEDICINE & REHABILITATION
Payer: COMMERCIAL

## 2024-11-04 ENCOUNTER — OFFICE VISIT (OUTPATIENT)
Dept: RHEUMATOLOGY | Facility: CLINIC | Age: 24
End: 2024-11-04

## 2024-11-04 ENCOUNTER — LAB ENCOUNTER (OUTPATIENT)
Dept: LAB | Facility: HOSPITAL | Age: 24
End: 2024-11-04
Attending: INTERNAL MEDICINE
Payer: COMMERCIAL

## 2024-11-04 VITALS
DIASTOLIC BLOOD PRESSURE: 69 MMHG | HEIGHT: 65 IN | SYSTOLIC BLOOD PRESSURE: 115 MMHG | RESPIRATION RATE: 16 BRPM | BODY MASS INDEX: 25.61 KG/M2 | WEIGHT: 153.69 LBS | HEART RATE: 88 BPM

## 2024-11-04 DIAGNOSIS — M25.50 POLYARTHRALGIA: ICD-10-CM

## 2024-11-04 DIAGNOSIS — R76.8 POSITIVE ANA (ANTINUCLEAR ANTIBODY): ICD-10-CM

## 2024-11-04 DIAGNOSIS — M46.90 INFLAMMATORY SPONDYLOPATHY, UNSPECIFIED SPINAL REGION (HCC): Primary | ICD-10-CM

## 2024-11-04 DIAGNOSIS — R35.89 POLYURIA: ICD-10-CM

## 2024-11-04 LAB
BILIRUB UR QL: NEGATIVE
C3 SERPL-MCNC: 86.1 MG/DL (ref 82–160)
C4 SERPL-MCNC: 18 MG/DL (ref 12–36)
CLARITY UR: CLEAR
GLUCOSE UR-MCNC: NORMAL MG/DL
HGB UR QL STRIP.AUTO: NEGATIVE
KETONES UR-MCNC: NEGATIVE MG/DL
LEUKOCYTE ESTERASE UR QL STRIP.AUTO: NEGATIVE
NITRITE UR QL STRIP.AUTO: NEGATIVE
PH UR: 5 [PH] (ref 5–8)
PROT UR-MCNC: NEGATIVE MG/DL
SP GR UR STRIP: 1.01 (ref 1–1.03)
THYROGLOB SERPL-MCNC: <15 U/ML (ref ?–60)
THYROPEROXIDASE AB SERPL-ACNC: 43 U/ML (ref ?–60)
UROBILINOGEN UR STRIP-ACNC: NORMAL

## 2024-11-04 PROCEDURE — 86235 NUCLEAR ANTIGEN ANTIBODY: CPT

## 2024-11-04 PROCEDURE — 86038 ANTINUCLEAR ANTIBODIES: CPT

## 2024-11-04 PROCEDURE — 99214 OFFICE O/P EST MOD 30 MIN: CPT | Performed by: INTERNAL MEDICINE

## 2024-11-04 PROCEDURE — 86800 THYROGLOBULIN ANTIBODY: CPT

## 2024-11-04 PROCEDURE — 86160 COMPLEMENT ANTIGEN: CPT

## 2024-11-04 PROCEDURE — 3074F SYST BP LT 130 MM HG: CPT | Performed by: INTERNAL MEDICINE

## 2024-11-04 PROCEDURE — 85613 RUSSELL VIPER VENOM DILUTED: CPT

## 2024-11-04 PROCEDURE — 85390 FIBRINOLYSINS SCREEN I&R: CPT

## 2024-11-04 PROCEDURE — 3008F BODY MASS INDEX DOCD: CPT | Performed by: INTERNAL MEDICINE

## 2024-11-04 PROCEDURE — 85598 HEXAGNAL PHOSPH PLTLT NEUTRL: CPT

## 2024-11-04 PROCEDURE — 86225 DNA ANTIBODY NATIVE: CPT

## 2024-11-04 PROCEDURE — 86039 ANTINUCLEAR ANTIBODIES (ANA): CPT

## 2024-11-04 PROCEDURE — 86376 MICROSOMAL ANTIBODY EACH: CPT

## 2024-11-04 PROCEDURE — 81003 URINALYSIS AUTO W/O SCOPE: CPT

## 2024-11-04 PROCEDURE — 36415 COLL VENOUS BLD VENIPUNCTURE: CPT

## 2024-11-04 PROCEDURE — 3078F DIAST BP <80 MM HG: CPT | Performed by: INTERNAL MEDICINE

## 2024-11-04 PROCEDURE — 85610 PROTHROMBIN TIME: CPT

## 2024-11-04 PROCEDURE — 85730 THROMBOPLASTIN TIME PARTIAL: CPT

## 2024-11-04 RX ORDER — SULFASALAZINE 500 MG/1
500 TABLET ORAL 2 TIMES DAILY
Qty: 60 TABLET | Refills: 2 | Status: SHIPPED | OUTPATIENT
Start: 2024-11-04
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
## 2024-11-04 NOTE — PATIENT INSTRUCTIONS
Try sulfasalazine 500mg twice a day    Check labs today for lupus   Check labs again in 1month  Return to clinic in 1month. 12/23 at 12:40 pm

## 2024-11-05 LAB
APTT PPP: 25.5 SECONDS (ref 23–36)
CONFIRM APTT STACLOT: NEGATIVE
CONFIRM DRVVT: 0.8 S (ref 0–1.1)
DSDNA AB TITR SER: <10 {TITER}
ENA RNP IGG SER IA-ACNC: 0.9 U/ML
ENA SCL70 IGG SER IA-ACNC: <0.6 U/ML
ENA SM IGG SER IA-ACNC: <0.7 U/ML
ENA SS-A IGG SER IA-ACNC: <0.4 U/ML
ENA SS-B IGG SER IA-ACNC: <0.4 U/ML
PROTHROMBIN TIME: 13.4 SECONDS (ref 11.6–14.8)
U1 SNRNP IGG SER IA-ACNC: 0.7 U/ML

## 2024-11-08 LAB
ANA NUCLEOLAR TITR SER IF: 80 {TITER}
NUCLEAR IGG TITR SER IF: POSITIVE {TITER}

## 2024-11-22 ENCOUNTER — OFFICE VISIT (OUTPATIENT)
Dept: PHYSICAL THERAPY | Facility: HOSPITAL | Age: 24
End: 2024-11-22
Attending: PHYSICAL MEDICINE & REHABILITATION
Payer: COMMERCIAL

## 2024-11-22 PROCEDURE — 97530 THERAPEUTIC ACTIVITIES: CPT | Performed by: PHYSICAL THERAPIST

## 2024-11-22 PROCEDURE — 97140 MANUAL THERAPY 1/> REGIONS: CPT | Performed by: PHYSICAL THERAPIST

## 2024-11-22 NOTE — PROGRESS NOTES
2024  Patient Name: Huma Shields  YOB: 2000          MRN number:  [id number]  Referring Physician:  Brian Crhisty    Pt has attended 5, cancelled 0, and no shown 0 visits in Physical Therapy from the Evaluation on 24 to treatment date of 2024    PROGRESS NOTE  Dx: Cervical radiculopathy (M54.12)  Cervical disc disease (M50.90)  Thoracic facet joint syndrome (M47.894)  Thoracic disc disease (M51.9)             Authorized # of Visits:  5 visit approved          Next MD visit: none   Fall Risk: standard         Precautions:  general  Medication Changes since last visit?: No    Subjective: Reports  the rheumatologist told her, her HLB27 was (+) along with a (+) TRACY.  Also has a ganglion cyst in her L wrist.  States her neck is very sore on the L side and she has a hard time lifting her L arm OH.  Reports she is doing her Pilates 4 times per week. Keep her necks down with the neck pillow most of the class.  Pain Ratin/10 VAS    Objective:     Cervical AROM:  Pain (+/-)   Flexion 45    Extension 40    R Sidebend 40    L Sidebend 30    R Rotation 65    L Rotation 65    Protrusion WFL    Retraction Limited 25%        CARYN Testing:    CARYN Testing R L   Cervical Axial Rotation + +   Apical Expansion decreased WFL   Adduction Drop Test + +   Extension Drop Test NT NT   SLR 85 85   Trunk Rotation NT NT   Posterior Mediastinum Expansion Test limited limited   Standing Reach Test Fingertips to floor Fingertips to floor   Elevated and ER Anterior Rib yes yes        Shoulder AROM:      R    L   Flex 175   175   Abd 175`` 175   ER 90 90   IR 45 45   Hort abd 45 45       Strength UE:   5/5 MMT Scale   R  L   Shoulder flex  5     5   Shoulder ext NT NT   Abduction (C5) 5 5   ER 4 4   IR 4+ 4+   Biceps (C6) 5 5   Wrist ext (C6) 5 5   Triceps (C7) 5 5   Wrist flex (C7) 5 5   EPL (C8)  4 4   Interossei (T1) 4 4     Strength Scapular: 5/5 MMT Scale   R L   Upper trap (C4) 5 5   Rhomboids 4- 4-    Mid trap 4- 4-   Lower trap 4- 4-   Serratus 4- 4-     Flexibility:   R L   Upper trap long long   Pec Major short short   Pec Minor short short   Lats short WFL     Outcome Surverys  Neck Disability Index Score  Score: 30 % (9/6/2024  3:01 PM)      Palpation: TTP over the L UT and cervical paraspinals  Upper Limb Tension Tests: (+) for median nerve L  Neuro Screen: (-) heel walking and toe walking     9/23/2024  Visit #   1 9/30/2024  Visit #2 10/18/2024  Visit #3 11/22/2024  Visit #4   Manual Therapy  Brachial Chain Manual Techniques  1.  L AIC  2.  Sternal Technique  3.  R superior T4  4.  R subclavious  5.  R intercostal release  6.  L pec major      Prone central PA\"s CT junction    STM bilateral UT\"s and cervical paraspinals STM/MFR cervical spine and UT\"s    Prone central PA's - grade I/II STM.MFR cervical spine and UT's    First ribs mobs/MET L    Side glides L to R   Therapeutic Exercise       Therapeutic Activity  Joint protection techniques for her hands    Use of wrist brace      Education on ways to reduce systemic inflammation    Strategies for low impact workouts at the gym   Neuromuscular Education 90/90 repositioning with L hip sift    Supine diagonal flexion    Supine weighted scapular protraction      TNE Education       HEP 90/90 repositioning with L hip sift    Supine diagonal flexion    Supine weighted scapular protraction Continue with current HEP Continue with current HEP      Assessment: Huma Shields has completed 5 visits of PT and reports continued symptoms.  The pt was educated on non-impact workouts for the gym secondary to new HLB27 finding.  Educated in home stretching to improve anterior chest wall opening.  Continued with manual chest wall therapy which the patient reported relief from.  Will benefit from continued PT to decrease her pain and further improve her functional mobility.  Recommend skilled PT 1 time per week for up to 10 visits.  The pt is in agreement with the  POC.      Goals:     The pt was educated on the plan of care, purpose and individual goals for therapy, precautions for therapy.  All questions were answered.     1.  The pt will be independent in their HEP.  MET 11/22/2024  2.  Centralization of symptoms to the cervical spine.  MET  11/22/2024  3.  The pt will be able to complete a modified workout program. PROGRESSING 11/22/2024  4.  The pt will be able to complete a full work day without an increase in symptoms.  PROGRESSING    5.  The pt will be able to sleep though the night without waking up from pain.  MET  11/22/2024  6.  The pt will report no HA's x 1 month.  PROGRESSING 11/22/2024      Frequency/Duration: Patient will be seen for 1 x/week or a total of 10 visits over a 90 day period. Treatment will include: Manual Therapy; Therapeutic Exercises; Neuromuscular Re-education; Therapeutic Activity; Patient education; Home exercise program instruction; TNE Education, Modalities as needed.    Education or treatment limitation: None  Rehab Potential: good    Charges: Man 3 (38), TA1(8)   Total Timed Treatment: 46 min  Total Treatment Time: 46 min         Patient was advised of these findings, precautions, and treatment options and has agreed to actively participate in planning and for this course of care.    Thank you for your referral. Please co-sign or sign and return this letter via fax as soon as possible to [phone]. If you have any questions, please contact me at Dept: [phone].    Sincerely,  TIEN GRANADO PT    Electronically signed by therapist: TIEN GRANADO PT    Physician's certification required: Yes  I certify the need for these services furnished under this plan of treatment and while under my care.    X___________________________________________________ Date____________________    Certification From: 11/22/2024  To:2/20/2025

## 2024-11-30 ENCOUNTER — OFFICE VISIT (OUTPATIENT)
Dept: FAMILY MEDICINE CLINIC | Facility: CLINIC | Age: 24
End: 2024-11-30
Payer: COMMERCIAL

## 2024-11-30 VITALS
TEMPERATURE: 98 F | HEART RATE: 94 BPM | SYSTOLIC BLOOD PRESSURE: 138 MMHG | OXYGEN SATURATION: 97 % | WEIGHT: 155 LBS | BODY MASS INDEX: 25.83 KG/M2 | DIASTOLIC BLOOD PRESSURE: 84 MMHG | RESPIRATION RATE: 16 BRPM | HEIGHT: 65 IN

## 2024-11-30 DIAGNOSIS — R59.1 LYMPHADENOPATHY: Primary | ICD-10-CM

## 2024-11-30 PROCEDURE — 87081 CULTURE SCREEN ONLY: CPT | Performed by: NURSE PRACTITIONER
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
## 2024-12-02 ENCOUNTER — LAB ENCOUNTER (OUTPATIENT)
Dept: LAB | Age: 24
End: 2024-12-02
Attending: INTERNAL MEDICINE
Payer: COMMERCIAL

## 2024-12-02 ENCOUNTER — PATIENT MESSAGE (OUTPATIENT)
Dept: INTERNAL MEDICINE CLINIC | Facility: CLINIC | Age: 24
End: 2024-12-02

## 2024-12-02 ENCOUNTER — OFFICE VISIT (OUTPATIENT)
Dept: INTERNAL MEDICINE CLINIC | Facility: CLINIC | Age: 24
End: 2024-12-02

## 2024-12-02 VITALS
HEIGHT: 65 IN | WEIGHT: 151.63 LBS | SYSTOLIC BLOOD PRESSURE: 120 MMHG | BODY MASS INDEX: 25.26 KG/M2 | HEART RATE: 87 BPM | DIASTOLIC BLOOD PRESSURE: 83 MMHG

## 2024-12-02 DIAGNOSIS — R21 RASH: ICD-10-CM

## 2024-12-02 DIAGNOSIS — R22.1 NECK SWELLING: Primary | ICD-10-CM

## 2024-12-02 DIAGNOSIS — R22.1 NECK SWELLING: ICD-10-CM

## 2024-12-02 LAB
DEPRECATED RDW RBC AUTO: 38.8 FL (ref 35.1–46.3)
ERYTHROCYTE [DISTWIDTH] IN BLOOD BY AUTOMATED COUNT: 11.9 % (ref 11–15)
HCT VFR BLD AUTO: 40.5 %
HGB BLD-MCNC: 13.9 G/DL
MCH RBC QN AUTO: 30.8 PG (ref 26–34)
MCHC RBC AUTO-ENTMCNC: 34.3 G/DL (ref 31–37)
MCV RBC AUTO: 89.6 FL
PLATELET # BLD AUTO: 253 10(3)UL (ref 150–450)
RBC # BLD AUTO: 4.52 X10(6)UL
WBC # BLD AUTO: 4.6 X10(3) UL (ref 4–11)

## 2024-12-02 PROCEDURE — 3079F DIAST BP 80-89 MM HG: CPT | Performed by: INTERNAL MEDICINE

## 2024-12-02 PROCEDURE — 36415 COLL VENOUS BLD VENIPUNCTURE: CPT

## 2024-12-02 PROCEDURE — 3074F SYST BP LT 130 MM HG: CPT | Performed by: INTERNAL MEDICINE

## 2024-12-02 PROCEDURE — 3008F BODY MASS INDEX DOCD: CPT | Performed by: INTERNAL MEDICINE

## 2024-12-02 PROCEDURE — 99214 OFFICE O/P EST MOD 30 MIN: CPT | Performed by: INTERNAL MEDICINE

## 2024-12-02 PROCEDURE — 85027 COMPLETE CBC AUTOMATED: CPT

## 2024-12-02 PROCEDURE — G2211 COMPLEX E/M VISIT ADD ON: HCPCS | Performed by: INTERNAL MEDICINE
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
## 2024-12-04 ENCOUNTER — HOSPITAL ENCOUNTER (OUTPATIENT)
Dept: CT IMAGING | Facility: HOSPITAL | Age: 24
Discharge: HOME | End: 2024-12-04
Attending: INTERNAL MEDICINE
Payer: COMMERCIAL

## 2024-12-04 ENCOUNTER — HOSPITAL ENCOUNTER (OUTPATIENT)
Dept: ULTRASOUND IMAGING | Facility: HOSPITAL | Age: 24
Discharge: HOME | End: 2024-12-04
Attending: INTERNAL MEDICINE
Payer: COMMERCIAL

## 2024-12-04 DIAGNOSIS — R22.1 NECK SWELLING: ICD-10-CM

## 2024-12-04 LAB
CREAT BLD-MCNC: 0.7 MG/DL
EGFRCR SERPLBLD CKD-EPI 2021: 123 ML/MIN/1.73M2 (ref 60–?)

## 2024-12-04 PROCEDURE — 70491 CT SOFT TISSUE NECK W/DYE: CPT | Performed by: INTERNAL MEDICINE

## 2024-12-04 PROCEDURE — 82565 ASSAY OF CREATININE: CPT

## 2024-12-04 PROCEDURE — 76536 US EXAM OF HEAD AND NECK: CPT | Performed by: INTERNAL MEDICINE

## 2024-12-05 ENCOUNTER — PATIENT MESSAGE (OUTPATIENT)
Dept: RHEUMATOLOGY | Facility: CLINIC | Age: 24
End: 2024-12-05

## 2024-12-06 ENCOUNTER — APPOINTMENT (OUTPATIENT)
Dept: PHYSICAL THERAPY | Facility: HOSPITAL | Age: 24
End: 2024-12-06
Attending: PHYSICAL MEDICINE & REHABILITATION

## 2024-12-06 NOTE — TELEPHONE ENCOUNTER
[employer] message sent.      Dear Huma Shields,     Your test results have been reviewed. Lymph nodes are seen. There is also and incidental finding near the sinuses that requires an evaluation by a dentist. Please call to make that appointment. Follow up with ENT as discussed as well .  Please follow-up with our office if you have any questions or concerns.     Thank you.     Rissa Robles MD   Written by Rissa Robles MD on 12/5/2024  9:10 PM CST  Seen by patient Huma Shields on 12/6/2024  8:45 AM

## 2024-12-10 ENCOUNTER — OFFICE VISIT (OUTPATIENT)
Dept: OTOLARYNGOLOGY | Facility: CLINIC | Age: 24
End: 2024-12-10

## 2024-12-10 DIAGNOSIS — R59.0 CERVICAL LYMPHADENOPATHY: Primary | ICD-10-CM

## 2024-12-10 PROCEDURE — 99203 OFFICE O/P NEW LOW 30 MIN: CPT | Performed by: OTOLARYNGOLOGY

## 2024-12-10 RX ORDER — PREDNISONE 10 MG/1
TABLET ORAL
Qty: 44 TABLET | Refills: 0 | Status: SHIPPED | OUTPATIENT
Start: 2024-12-10

## 2024-12-10 NOTE — TELEPHONE ENCOUNTER
Please advise.    LOV: 11/04/24  Future Appointments   Date Time Provider Department Center   12/20/2024  3:00 PM Jennifer Arango, PT [facility] NEURO PT EM [facility]   12/23/2024 12:40 PM Jannette Rush MD [facility]   1/7/2025  9:00 AM Chris Castillo MD [facility] access hospitalENT [facility]   1/14/2025  8:40 AM Lizzie Aranda MD Critical access hospitalOBGYN [facility]   3/17/2025  9:00 AM [facility] MD [facility]     Labs:   Component      Latest Ref Rng 11/4/2024   Color Urine      Yellow  Light-Yellow    Clarity Urine      Clear  Clear    Spec Gravity      1.005 - 1.030  1.012    Glucose Urine      Normal mg/dL Normal    Bilirubin Urine      Negative  Negative    Ketones, UA      Negative mg/dL Negative    Blood Urine      Negative  Negative    PH Urine      5.0 - 8.0  5.0    Protein Urine      Negative mg/dL Negative    Urobilinogen Urine      Normal  Normal    Nitrite Urine      Negative  Negative    Leukocyte Esterase       Negative  Negative    Microscopic Microscopic not indicated    Lupus Anticoag Screen Interp Conclusion: Negative…    PT      11.6 - 14.8 seconds 13.4    APTT      23.0 - 36.0 seconds 25.5    Hexagonal Phase Phospholipid      Negative  Negative    DRVVT Ratio      0.0 - 1.1  0.8    Anti Double Strand DNA      <10  <10    Reviewed By: Humza Arango M.D.    Reviewed By: Maikel Cross M.D.    Anti-U1RNP Antibody, IGG      <5 U/mL 0.7    Anti-RNP70 Antibody, IGG      <7 U/mL 0.9    TRACY Titer/Pattern      <80  80 !    ANTI-THYROPEROXIDASE      <60 U/mL 43    ANTI-THYROGLOBULIN      <60 U/mL <15    TRACY SCREEN      Negative  Positive !    Anti-Smith Antibody, IGG      <7 U/mL <0.7    Anti-SCL70 Antibody, IGG      <7 U/mL <0.6    COMPLEMENT C3      82.0 - 160.0 mg/dL 86.1    COMPLEMENT C4      12.0 - 36.0 mg/dL 18.0    Anti-SSA Antibody, IGG      <7 U/mL <0.4    Anti-SSB Antibody, IGG      <7 U/mL <0.4       Legend:  ! Abnormal     Summary:  Try sulfasalazine 500mg twice a day    Check labs today for lupus   Check labs again in  1month  Return to clinic in 1month. 12/23 at 12:40 pm         Jannette Rush MD  11/4/2024   12:59 PM
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
## 2024-12-13 ENCOUNTER — APPOINTMENT (OUTPATIENT)
Dept: PHYSICAL THERAPY | Facility: HOSPITAL | Age: 24
End: 2024-12-13
Attending: PHYSICAL MEDICINE & REHABILITATION

## 2024-12-19 NOTE — TELEPHONE ENCOUNTER
Appointment scheduled.   Future Appointments   Date Time Provider Department [place]   1/7/2025  9:00 AM [facility]   1/14/2025  8:40 AM [facility]   1/20/2025 12:40 PM [facility]   3/17/2025  9:00 AM Rissa Robles MD [facility]

## 2024-12-20 ENCOUNTER — APPOINTMENT (OUTPATIENT)
Dept: PHYSICAL THERAPY | Facility: HOSPITAL | Age: 24
End: 2024-12-20
Attending: PHYSICAL MEDICINE & REHABILITATION

## 2025-01-02 ENCOUNTER — TELEPHONE (OUTPATIENT)
Dept: RHEUMATOLOGY | Facility: CLINIC | Age: 25
End: 2025-01-02

## 2025-01-02 DIAGNOSIS — M46.90 INFLAMMATORY SPONDYLOPATHY, UNSPECIFIED SPINAL REGION: Primary | ICD-10-CM

## 2025-01-02 NOTE — TELEPHONE ENCOUNTER
Patient has her upcoming follow up appointment with Dr. Ruhs. Patient has HMO insurance and there is no referral on file.

## 2025-01-07 ENCOUNTER — OFFICE VISIT (OUTPATIENT)
Dept: OTOLARYNGOLOGY | Facility: CLINIC | Age: 25
End: 2025-01-07

## 2025-01-07 ENCOUNTER — TELEPHONE (OUTPATIENT)
Dept: CASE MANAGEMENT | Age: 25
End: 2025-01-07

## 2025-01-07 DIAGNOSIS — R59.0 CERVICAL LYMPHADENOPATHY: Primary | ICD-10-CM

## 2025-01-07 DIAGNOSIS — R22.1 NECK SWELLING: Primary | ICD-10-CM

## 2025-01-07 PROCEDURE — 99213 OFFICE O/P EST LOW 20 MIN: CPT | Performed by: OTOLARYNGOLOGY
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
## 2025-01-07 NOTE — TELEPHONE ENCOUNTER
Dr. Robles,     Patient called requesting referral to Dr. Castillo for a follow up appointment today, 1/7/25.     Pended referral please review diagnosis and sign off if you agree.    Thank you.  Camilla Auguste  [facility]

## 2025-01-08 ENCOUNTER — HOSPITAL ENCOUNTER (OUTPATIENT)
Dept: ULTRASOUND IMAGING | Facility: HOSPITAL | Age: 25
Discharge: HOME | End: 2025-01-08
Attending: OTOLARYNGOLOGY
Payer: COMMERCIAL

## 2025-01-08 DIAGNOSIS — R59.0 CERVICAL LYMPHADENOPATHY: ICD-10-CM

## 2025-01-08 PROCEDURE — 76536 US EXAM OF HEAD AND NECK: CPT | Performed by: OTOLARYNGOLOGY

## 2025-01-10 ENCOUNTER — PATIENT MESSAGE (OUTPATIENT)
Dept: OTOLARYNGOLOGY | Facility: CLINIC | Age: 25
End: 2025-01-10

## 2025-01-13 ENCOUNTER — TELEPHONE (OUTPATIENT)
Dept: OTOLARYNGOLOGY | Facility: CLINIC | Age: 25
End: 2025-01-13

## 2025-01-13 NOTE — TELEPHONE ENCOUNTER
Patient is returning the nurses call to go over her test results. Patient did schedule a follow up appointment for Friday 1/17/2025 at 2:50pm.

## 2025-01-13 NOTE — TELEPHONE ENCOUNTER
Had to reschedule Huma's 1/17/25 appt, she was scheduled in the same spot I had scheduled another pt, she was willing to move to 2:40 pm instead of 2:50pm.

## 2025-01-13 NOTE — TELEPHONE ENCOUNTER
Voicemail left for Huma, Dr. Castillo said the Ultrasound demonstrates what appears to be fairly stable lymph nodes, and the one in particular that she is concerned about has actually come down in size since her previous ultrasound.  He would like her to return to the clinic to go over the results of her ultrasound.

## 2025-01-14 ENCOUNTER — OFFICE VISIT (OUTPATIENT)
Dept: OBGYN CLINIC | Facility: CLINIC | Age: 25
End: 2025-01-14

## 2025-01-14 VITALS
BODY MASS INDEX: 24 KG/M2 | WEIGHT: 145.63 LBS | DIASTOLIC BLOOD PRESSURE: 84 MMHG | SYSTOLIC BLOOD PRESSURE: 121 MMHG | HEART RATE: 85 BPM

## 2025-01-14 DIAGNOSIS — Z01.419 WELL WOMAN EXAM WITH ROUTINE GYNECOLOGICAL EXAM: Primary | ICD-10-CM

## 2025-01-14 PROCEDURE — 3074F SYST BP LT 130 MM HG: CPT | Performed by: OBSTETRICS & GYNECOLOGY

## 2025-01-14 PROCEDURE — 3079F DIAST BP 80-89 MM HG: CPT | Performed by: OBSTETRICS & GYNECOLOGY

## 2025-01-14 PROCEDURE — 99395 PREV VISIT EST AGE 18-39: CPT | Performed by: OBSTETRICS & GYNECOLOGY
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
## 2025-01-17 ENCOUNTER — OFFICE VISIT (OUTPATIENT)
Dept: OTOLARYNGOLOGY | Facility: CLINIC | Age: 25
End: 2025-01-17

## 2025-01-17 DIAGNOSIS — R59.0 CERVICAL LYMPHADENOPATHY: Primary | ICD-10-CM

## 2025-01-17 PROCEDURE — 99213 OFFICE O/P EST LOW 20 MIN: CPT | Performed by: OTOLARYNGOLOGY
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
## 2025-01-21 ENCOUNTER — HOSPITAL ENCOUNTER (OUTPATIENT)
Dept: ULTRASOUND IMAGING | Facility: HOSPITAL | Age: 25
Discharge: HOME | End: 2025-01-21
Attending: OTOLARYNGOLOGY
Payer: COMMERCIAL

## 2025-01-21 DIAGNOSIS — R59.0 CERVICAL LYMPHADENOPATHY: ICD-10-CM

## 2025-01-21 LAB — NON GYNE INTERPRETATION: NEGATIVE

## 2025-01-21 PROCEDURE — 88189 FLOWCYTOMETRY/READ 16 & >: CPT | Performed by: OTOLARYNGOLOGY

## 2025-01-21 PROCEDURE — 88185 FLOWCYTOMETRY/TC ADD-ON: CPT | Performed by: OTOLARYNGOLOGY

## 2025-01-21 PROCEDURE — 88184 FLOWCYTOMETRY/ TC 1 MARKER: CPT | Performed by: OTOLARYNGOLOGY

## 2025-01-21 PROCEDURE — 10005 FNA BX W/US GDN 1ST LES: CPT | Performed by: OTOLARYNGOLOGY

## 2025-01-21 PROCEDURE — 88173 CYTOPATH EVAL FNA REPORT: CPT | Performed by: OTOLARYNGOLOGY

## 2025-01-21 PROCEDURE — 88172 CYTP DX EVAL FNA 1ST EA SITE: CPT | Performed by: OTOLARYNGOLOGY

## 2025-01-21 PROCEDURE — 88177 CYTP FNA EVAL EA ADDL: CPT | Performed by: OTOLARYNGOLOGY

## 2025-01-21 NOTE — IMAGING NOTE
1330 Pt arrived to [facility] by NAN Corbett RN    History taken by NAN Corbett RN, as follows: Multiple lymph nodes in neck, referred for biopsy        Procedure explained questions answered by NAN Corbett RN.    Consent verified and obtained by NAN Corbett RN      1355 Pt brought to ultrasound room      Scans taken by Elvia ultrasound  sonographer     1358  scans reviewed by Dr. KYAW MD made decision to biopsy only the largest lymph node right submandibular, pt in agreement    Site marked RIGHT SUBMANDIBULAR    1405 Time out taken      1407 Area cleaned sterile towels to site. Pathology was notified.      1408 Lidocaine 1% 10 milligrams per ml  from kit was given 1 ml         FNA # 1 taken at 1409 with 22 g needle    FNA # 2 taken at 1410 with 22 g needle    FNA # 3 taken at 1412 with 22 g needle    FNA # 4 taken at 1413 with 22 g needle    1414 Procedure completed, area re-scanned, no bleeding. Area cleaned, band aid to site, cold pack provided    1428 Post instructions given verbal et written with AVS summary sheet provided to patient.  Also instructed patient to refrain from drinking or eating anything hot for several hours after biopsy  to prevent increase bleeding from occurring.    1433  Pt discharged, pt feels well.

## 2025-01-21 NOTE — DISCHARGE INSTRUCTIONS
Procedure performed by  ___________________.    Biopsy/Aspiration of BILATERAL NECK LYMPH NODE BIOPSY FINE NEEDLE ASPIRATION.    DISCHARGE INSTRUCTIONS     LYMPH NODE  Biopsy:  You may develop a sore throat after the procedure.  If necessary,                               throat lozenges may be used to relieve the discomfort.  Call your                               physician immediately if you experience increased swelling in your                                neck, difficulty breathing, or difficulty swallowing.        Additional Discharge Instructions for all Biopsies:                               DO NOT TAKE aspirin-containing products, Ibuprofen, Vitamin E, or                              blood thinning products for three (3) days after the procedure.  You may                             take Tylenol (1 or 2 tablets every 4-6 hours) for mild discomfort at the                             biopsy site.  Rest quietly for 24 hours, no physical activity.  Avoid                              straining, heavy lifting, or strenuous physical activity for approximately                             2 days.  Report any bleeding at aspiration/biopsy site, redness,                             swelling, odor, discharge, pain or fever that does not lessen after one                              day, to your physician.  Resume a regular diet.  Call your physician with                             questions or test results.  Also you may contact the Radiology Nurse                             at [phone] with any additional questions or concerns.    Other: ICE TO SITES IF NEEDED, NO LONGER THAN 10 MIN PER HOUR. MAY EAT AND DRINK.  AVOID HOT LIQUIDS FOR 1 HOUR.    BRING THIS SHEET WITH YOU SHOULD YOU HAVE TO VISIT AN EMERGENCY ROOM OR SEE YOUR DOCTOR IN THE NEXT 24 HOURS.

## 2025-01-22 LAB
CD10 CELLS NFR SPEC: <1 %
CD10/CD19: <1 %
CD19 CELLS NFR SPEC: 16 %
CD19+/CD200+: 8 %
CD2 CELLS NFR SPEC: 84 %
CD20 CELLS NFR SPEC: 17 %
CD200 CELLS: 17 %
CD3 CELLS NFR SPEC: 83 %
CD3+/TCRGD+: 1 %
CD3+CD4+ CELLS NFR SPEC: 64 %
CD3+CD4+ CELLS/CD3+CD8+ CLL SPEC: 3.6
CD3+CD8+ CELLS NFR SPEC: 18 %
CD3-/CD56+: <1 %
CD34 CELLS NFR SPEC: <1 %
CD38 CELLS NFR SPEC: 1 %
CD38+/CD19+: <1 %
CD45 CELLS NFR SPEC: 100 %
CD5 CELLS NFR SPEC: 84 %
CD5/CD19 CELLS: 1 %
CD7 CELLS NFR SPEC: 83 %
CELL SURF KAPPA/LAMBDA RATIO: 1.7
CELL SURF LAMBDA LIGHT CHAIN: 6 %
CELL SURFACE KAPPA LIGHT CHAIN: 10 %
TCR G-D CELLS NFR SPEC: 1 %

## 2025-03-17 ENCOUNTER — LAB ENCOUNTER (OUTPATIENT)
Dept: LAB | Age: 25
End: 2025-03-17
Attending: INTERNAL MEDICINE
Payer: COMMERCIAL

## 2025-03-17 ENCOUNTER — OFFICE VISIT (OUTPATIENT)
Dept: INTERNAL MEDICINE CLINIC | Facility: CLINIC | Age: 25
End: 2025-03-17

## 2025-03-17 VITALS
HEIGHT: 65 IN | SYSTOLIC BLOOD PRESSURE: 109 MMHG | TEMPERATURE: 97 F | DIASTOLIC BLOOD PRESSURE: 71 MMHG | BODY MASS INDEX: 24.26 KG/M2 | WEIGHT: 145.63 LBS | HEART RATE: 72 BPM | OXYGEN SATURATION: 99 %

## 2025-03-17 DIAGNOSIS — Z00.00 PHYSICAL EXAM, ANNUAL: Primary | ICD-10-CM

## 2025-03-17 DIAGNOSIS — N92.6 IRREGULAR MENSES: ICD-10-CM

## 2025-03-17 DIAGNOSIS — M46.90 INFLAMMATORY SPONDYLOPATHY, UNSPECIFIED SPINAL REGION: ICD-10-CM

## 2025-03-17 DIAGNOSIS — Z12.83 SKIN CANCER SCREENING: ICD-10-CM

## 2025-03-17 DIAGNOSIS — Z00.00 PHYSICAL EXAM, ANNUAL: ICD-10-CM

## 2025-03-17 DIAGNOSIS — Z51.81 THERAPEUTIC DRUG MONITORING: ICD-10-CM

## 2025-03-17 LAB
ALT SERPL-CCNC: 11 U/L
ANION GAP SERPL CALC-SCNC: 5 MMOL/L (ref 0–18)
AST SERPL-CCNC: 12 U/L (ref ?–34)
BUN BLD-MCNC: 16 MG/DL (ref 9–23)
BUN/CREAT SERPL: 21.1 (ref 10–20)
CALCIUM BLD-MCNC: 9.3 MG/DL (ref 8.7–10.4)
CHLORIDE SERPL-SCNC: 105 MMOL/L (ref 98–112)
CHOLEST SERPL-MCNC: 179 MG/DL (ref ?–200)
CO2 SERPL-SCNC: 29 MMOL/L (ref 21–32)
CREAT BLD-MCNC: 0.76 MG/DL
CRP SERPL-MCNC: <0.4 MG/DL (ref ?–1)
DEPRECATED RDW RBC AUTO: 41 FL (ref 35.1–46.3)
EGFRCR SERPLBLD CKD-EPI 2021: 111 ML/MIN/1.73M2 (ref 60–?)
ERYTHROCYTE [DISTWIDTH] IN BLOOD BY AUTOMATED COUNT: 12.1 % (ref 11–15)
ERYTHROCYTE [SEDIMENTATION RATE] IN BLOOD: 3 MM/HR
FASTING PATIENT LIPID ANSWER: YES
FASTING STATUS PATIENT QL REPORTED: YES
GLUCOSE BLD-MCNC: 84 MG/DL (ref 70–99)
HCT VFR BLD AUTO: 40.5 %
HDLC SERPL-MCNC: 58 MG/DL (ref 40–59)
HGB BLD-MCNC: 13.6 G/DL
LDLC SERPL CALC-MCNC: 111 MG/DL (ref ?–100)
MCH RBC QN AUTO: 30.6 PG (ref 26–34)
MCHC RBC AUTO-ENTMCNC: 33.6 G/DL (ref 31–37)
MCV RBC AUTO: 91.2 FL
NONHDLC SERPL-MCNC: 121 MG/DL (ref ?–130)
OSMOLALITY SERPL CALC.SUM OF ELEC: 288 MOSM/KG (ref 275–295)
PLATELET # BLD AUTO: 260 10(3)UL (ref 150–450)
POTASSIUM SERPL-SCNC: 4 MMOL/L (ref 3.5–5.1)
RBC # BLD AUTO: 4.44 X10(6)UL
SODIUM SERPL-SCNC: 139 MMOL/L (ref 136–145)
TRIGL SERPL-MCNC: 53 MG/DL (ref 30–149)
TSI SER-ACNC: 1.69 UIU/ML (ref 0.55–4.78)
VLDLC SERPL CALC-MCNC: 9 MG/DL (ref 0–30)
WBC # BLD AUTO: 4.3 X10(3) UL (ref 4–11)

## 2025-03-17 PROCEDURE — 84450 TRANSFERASE (AST) (SGOT): CPT

## 2025-03-17 PROCEDURE — 84443 ASSAY THYROID STIM HORMONE: CPT

## 2025-03-17 PROCEDURE — 86140 C-REACTIVE PROTEIN: CPT

## 2025-03-17 PROCEDURE — 84460 ALANINE AMINO (ALT) (SGPT): CPT

## 2025-03-17 PROCEDURE — 85027 COMPLETE CBC AUTOMATED: CPT

## 2025-03-17 PROCEDURE — 85652 RBC SED RATE AUTOMATED: CPT

## 2025-03-17 PROCEDURE — 36415 COLL VENOUS BLD VENIPUNCTURE: CPT

## 2025-03-17 PROCEDURE — 80061 LIPID PANEL: CPT

## 2025-03-17 PROCEDURE — 80048 BASIC METABOLIC PNL TOTAL CA: CPT

## 2025-03-17 RX ORDER — AMOXICILLIN 500 MG/1
500 TABLET, FILM COATED ORAL 3 TIMES DAILY
COMMUNITY
Start: 2024-12-12 | End: 2025-03-17 | Stop reason: ALTCHOICE
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
## 2025-03-24 ENCOUNTER — TELEPHONE (OUTPATIENT)
Age: 25
End: 2025-03-24

## 2025-03-24 ENCOUNTER — OFFICE VISIT (OUTPATIENT)
Age: 25
End: 2025-03-24

## 2025-03-24 ENCOUNTER — LAB ENCOUNTER (OUTPATIENT)
Dept: LAB | Facility: HOSPITAL | Age: 25
End: 2025-03-24
Attending: INTERNAL MEDICINE
Payer: COMMERCIAL

## 2025-03-24 VITALS
BODY MASS INDEX: 24.16 KG/M2 | WEIGHT: 145 LBS | SYSTOLIC BLOOD PRESSURE: 116 MMHG | HEIGHT: 65 IN | DIASTOLIC BLOOD PRESSURE: 80 MMHG | HEART RATE: 85 BPM

## 2025-03-24 DIAGNOSIS — M46.90 INFLAMMATORY SPONDYLOPATHY, UNSPECIFIED SPINAL REGION: ICD-10-CM

## 2025-03-24 DIAGNOSIS — R76.8 POSITIVE ANA (ANTINUCLEAR ANTIBODY): ICD-10-CM

## 2025-03-24 DIAGNOSIS — Z51.81 THERAPEUTIC DRUG MONITORING: ICD-10-CM

## 2025-03-24 DIAGNOSIS — M46.90 INFLAMMATORY SPONDYLOPATHY, UNSPECIFIED SPINAL REGION: Primary | ICD-10-CM

## 2025-03-24 LAB
HBV CORE AB SERPL QL IA: NONREACTIVE
HBV SURFACE AG SER-ACNC: <0.1 [IU]/L
HBV SURFACE AG SERPL QL IA: NONREACTIVE
HCV AB SERPL QL IA: NONREACTIVE

## 2025-03-24 PROCEDURE — 86480 TB TEST CELL IMMUN MEASURE: CPT

## 2025-03-24 PROCEDURE — 87340 HEPATITIS B SURFACE AG IA: CPT

## 2025-03-24 PROCEDURE — 99214 OFFICE O/P EST MOD 30 MIN: CPT | Performed by: INTERNAL MEDICINE

## 2025-03-24 PROCEDURE — 3074F SYST BP LT 130 MM HG: CPT | Performed by: INTERNAL MEDICINE

## 2025-03-24 PROCEDURE — 3008F BODY MASS INDEX DOCD: CPT | Performed by: INTERNAL MEDICINE

## 2025-03-24 PROCEDURE — 36415 COLL VENOUS BLD VENIPUNCTURE: CPT

## 2025-03-24 PROCEDURE — 86803 HEPATITIS C AB TEST: CPT

## 2025-03-24 PROCEDURE — 86704 HEP B CORE ANTIBODY TOTAL: CPT

## 2025-03-24 PROCEDURE — 3079F DIAST BP 80-89 MM HG: CPT | Performed by: INTERNAL MEDICINE

## 2025-03-24 RX ORDER — ASCORBIC ACID 500 MG
500 TABLET ORAL DAILY
COMMUNITY
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
## 2025-03-24 NOTE — PATIENT INSTRUCTIONS
Try cimzia  400mg every month - with loading doses 0, 2, and 4 weeks then monthly -   [place] for copay card    Check labs today   Return to clinic in 2-3month. 5/14 at 4:30pm

## 2025-03-24 NOTE — TELEPHONE ENCOUNTER
Plan for cimzia 400mg 0, 2, and 4 week loading doses and then every 4 weeks   - will nee prior auth   - will need teaching  - she will be on her 's insurance - aetna - til he gets a new job in April -   So get prior auth under aetna -   She will not have her bcbs hmo after this wednesday

## 2025-03-28 LAB
M TB IFN-G CD4+ T-CELLS BLD-ACNC: 0.01 IU/ML
M TB TUBERC IFN-G BLD QL: NEGATIVE
M TB TUBERC IGNF/MITOGEN IGNF CONTROL: >10 IU/ML
QFT TB1 AG MINUS NIL: 0.01 IU/ML
QFT TB2 AG MINUS NIL: 0.01 IU/ML

## 2025-04-04 NOTE — TELEPHONE ENCOUNTER
PA start    Prior authorization for: Cimzia (2 Syringe)     Medication form: 200MG/ML syringe kit    Submission method: CoverMyMeds     PA Case ID #: [id number]  Key: [id number]    QF-TB result:   Component      Latest Ref Rng 3/24/2025   Quantiferon TB NIL      IU/mL 0.01    Quantiferon-TB1 Minus NIL      IU/mL 0.01    Quantiferon-TB2 Minus NIL      IU/mL 0.01    Quantiferon TB Mitogen minus NIL      IU/mL >10.00    Quantiferon TB Result      Negative  Negative

## 2025-04-04 NOTE — TELEPHONE ENCOUNTER
Called patient verified patient name and . Zip code wasn't working when trying to put in prior auth. Per patient the aetna insurance is connected with a different address then what is listed in her chart. Below is address for t    [address] [place] [id number]

## 2025-05-13 ENCOUNTER — TELEPHONE (OUTPATIENT)
Dept: OBGYN CLINIC | Facility: CLINIC | Age: 25
End: 2025-05-13

## 2025-05-13 NOTE — TELEPHONE ENCOUNTER
Patient states that she is pregnant and would like to start prenatal care. LMP: 04/07/2025. Patient states that she took 4 over the counter pregnancy tests, which were all positive.

## 2025-05-13 NOTE — TELEPHONE ENCOUNTER
LMP 4/7/25. 5w1d based on LMP. Cycles every 31-34 days. Pt agrees to see both male and female providers. Advised pt that we recommend pnv with DHA, folic acid and iron. Booked pt for OBN appt on 5/31 and new OB on 6/9. Pt agrees to call clinic back should she have any additional questions or concerns.

## 2025-05-31 ENCOUNTER — NURSE ONLY (OUTPATIENT)
Dept: OBGYN CLINIC | Facility: CLINIC | Age: 25
End: 2025-05-31
Payer: COMMERCIAL

## 2025-05-31 DIAGNOSIS — Z34.01 ENCOUNTER FOR SUPERVISION OF NORMAL FIRST PREGNANCY IN FIRST TRIMESTER (HCC): Primary | ICD-10-CM

## 2025-05-31 RX ORDER — CHOLECALCIFEROL (VITAMIN D3) 25 MCG
1 TABLET,CHEWABLE ORAL DAILY
COMMUNITY

## 2025-05-31 NOTE — PROGRESS NOTES
Pt seen for OBN appt today with no complaints. Normal PN labs ordered including varicella and qual. Pt advised all labs must be completed and resulted prior to NPN appt. If labs are not completed and resulted the NPN appt will be cancelled. Pt informed again of both male and female providers and the need to rotate PN appt with all providers since OB on-call will be the one that delivers her. Assisted pt with scheduling NPN appt with MD.       Height: 5'5\"  Weight: 143  BMI: 23.8    Partner's name is Federico Shields contact #[phone]; race: White  Occupation:     MEDICAL HISTORY    Anemia No    Anesthetic complications Yes Shut down central nervous system, during wisdom teeth removal   Anxiety/Depression  No    Autoimmune Disorder Yes HLAb27 inflammation of the back, eating healthy   Asthma  No    Cancer No    Diabetes  No    Gyne/breast Surgery No    Heart Disease No    Hepatitis/Liver Disease  No    History of blood transfusion No    History of abnormal pap No    Hypertension  No    Infertility  No    Kidney Disease/Frequent UTIs  No    Medication Allergies Yes Brevital Sodium, shut down central nervous system   Latex Allergies No    Food Allergies  No    Neurological Disorder/Epilepsy No    Operations/Hospitalizations Yes 2015 fix lig, mc-p jt, I-p jt;2016ACL repair; 2017 wisdom teeth removed, 2022 colonoscopy, 2022 cholecystectomy   TB exposure  No    Thyroid Dysfunction No    Trauma/Violence  No    Uterine Anomaly  No    Uterine Fibroids  No    Variocosities/DVTs No    Smoker No    Drug usage in prior year No    Alcohol Yes Prior to pregnancy   Would you accept a blood transfusion?  Yes                INFECTION HISTORY    Chlamydia No    Pt or partner have hx of Genital Herpes No    Gonorrhea No    Hepatitis B No    HIV No    HPV No    MRSA No    Syphilis No    Tattoos No    Live with someone or Exposed to TB No    Rash or viral illness since LMP  No    Varicella Yes Child zheng chicken pox, vaccine    Pets No        GENETICS SCREENING    Genetic Screening    Genetic Screening/Teratology Counseling- Includes patient, baby's father, or anyone in either family with:  Patient's age 35 years or older as of estimated date of delivery: No     Thalassemia (Italian, Greek, Mediterranean, or  background): MCV less than 80: No     Neural tube defect (Meningomyelocele, Spina bifida, or Anencephaly): No     Congenital heart defect: No     Down syndrome: No     Rodolfo-Sachs (Ashkenazi Baptist, Cajun, [place]ian [place]): No     Canavan disease (Ashkenazi Baptist): No     Familial dysautonomia (Ashkenazi Baptist): No     Sickle cell disease or trait (): No     Hemophilia or other blood disorders: No     Muscular dystrophy: No    Cystic fibrosis: No     Moody's chorea: No     Intellectual disability and/or autism: No     Other inherited genetic or chromosomal disorder: No     Maternal metabolic disorder (eg. Type 1 diabetes, PKU): No     Patient or baby's father had child with birth defects not listed above: No     Recurrent pregnancy loss, or a stillbirth: No     Medications (including supplements, vitamins, herbs, or OTC drugs)/illicit/recreational drugs/alcohol since last menstrual period: No                 MISC    Infant vaccinations  Yes    Pt. Has answered NO 5P questions and has NO  risk factors.    Pt. Given What pregnant women need to know handout.

## 2025-06-03 ENCOUNTER — LAB ENCOUNTER (OUTPATIENT)
Dept: LAB | Facility: HOSPITAL | Age: 25
End: 2025-06-03
Attending: OBSTETRICS & GYNECOLOGY
Payer: COMMERCIAL

## 2025-06-03 DIAGNOSIS — Z34.01 ENCOUNTER FOR SUPERVISION OF NORMAL FIRST PREGNANCY IN FIRST TRIMESTER (HCC): ICD-10-CM

## 2025-06-03 LAB
ANTIBODY SCREEN: NEGATIVE
BASOPHILS # BLD AUTO: 0.06 X10(3) UL (ref 0–0.2)
BASOPHILS NFR BLD AUTO: 0.6 %
DEPRECATED RDW RBC AUTO: 37.3 FL (ref 35.1–46.3)
EOSINOPHIL # BLD AUTO: 0.07 X10(3) UL (ref 0–0.7)
EOSINOPHIL NFR BLD AUTO: 0.7 %
ERYTHROCYTE [DISTWIDTH] IN BLOOD BY AUTOMATED COUNT: 11.6 % (ref 11–15)
HBV SURFACE AG SER-ACNC: <0.1 [IU]/L
HBV SURFACE AG SERPL QL IA: NONREACTIVE
HCG SERPL QL: POSITIVE
HCT VFR BLD AUTO: 39.8 % (ref 35–48)
HCV AB SERPL QL IA: NONREACTIVE
HGB BLD-MCNC: 13.6 G/DL (ref 12–16)
IMM GRANULOCYTES # BLD AUTO: 0.03 X10(3) UL (ref 0–1)
IMM GRANULOCYTES NFR BLD: 0.3 %
LYMPHOCYTES # BLD AUTO: 1.95 X10(3) UL (ref 1–4)
LYMPHOCYTES NFR BLD AUTO: 18.7 %
MCH RBC QN AUTO: 30.4 PG (ref 26–34)
MCHC RBC AUTO-ENTMCNC: 34.2 G/DL (ref 31–37)
MCV RBC AUTO: 88.8 FL (ref 80–100)
MONOCYTES # BLD AUTO: 0.54 X10(3) UL (ref 0.1–1)
MONOCYTES NFR BLD AUTO: 5.2 %
NEUTROPHILS # BLD AUTO: 7.77 X10 (3) UL (ref 1.5–7.7)
NEUTROPHILS # BLD AUTO: 7.77 X10(3) UL (ref 1.5–7.7)
NEUTROPHILS NFR BLD AUTO: 74.5 %
PLATELET # BLD AUTO: 282 10(3)UL (ref 150–450)
RBC # BLD AUTO: 4.48 X10(6)UL (ref 3.8–5.3)
RH BLOOD TYPE: POSITIVE
RUBV IGG SER QL: POSITIVE
RUBV IGG SER-ACNC: 52 IU/ML (ref 10–?)
T PALLIDUM AB SER QL IA: NONREACTIVE
WBC # BLD AUTO: 10.4 X10(3) UL (ref 4–11)

## 2025-06-03 PROCEDURE — 84703 CHORIONIC GONADOTROPIN ASSAY: CPT

## 2025-06-03 PROCEDURE — 86780 TREPONEMA PALLIDUM: CPT

## 2025-06-03 PROCEDURE — 87340 HEPATITIS B SURFACE AG IA: CPT

## 2025-06-03 PROCEDURE — 86901 BLOOD TYPING SEROLOGIC RH(D): CPT

## 2025-06-03 PROCEDURE — 86762 RUBELLA ANTIBODY: CPT

## 2025-06-03 PROCEDURE — 86787 VARICELLA-ZOSTER ANTIBODY: CPT

## 2025-06-03 PROCEDURE — 86850 RBC ANTIBODY SCREEN: CPT

## 2025-06-03 PROCEDURE — 87086 URINE CULTURE/COLONY COUNT: CPT

## 2025-06-03 PROCEDURE — 86803 HEPATITIS C AB TEST: CPT

## 2025-06-03 PROCEDURE — 85025 COMPLETE CBC W/AUTO DIFF WBC: CPT

## 2025-06-03 PROCEDURE — 87389 HIV-1 AG W/HIV-1&-2 AB AG IA: CPT

## 2025-06-03 PROCEDURE — 36415 COLL VENOUS BLD VENIPUNCTURE: CPT

## 2025-06-03 PROCEDURE — 86900 BLOOD TYPING SEROLOGIC ABO: CPT

## 2025-06-04 LAB — VZV IGG SER IA-ACNC: 15.1 (ref 1–?)

## 2025-06-09 ENCOUNTER — INITIAL PRENATAL (OUTPATIENT)
Dept: OBGYN CLINIC | Facility: CLINIC | Age: 25
End: 2025-06-09

## 2025-06-09 VITALS
HEART RATE: 91 BPM | WEIGHT: 146.81 LBS | DIASTOLIC BLOOD PRESSURE: 77 MMHG | BODY MASS INDEX: 24 KG/M2 | SYSTOLIC BLOOD PRESSURE: 120 MMHG

## 2025-06-09 DIAGNOSIS — Z34.91 ENCOUNTER FOR SUPERVISION OF NORMAL PREGNANCY IN FIRST TRIMESTER, UNSPECIFIED GRAVIDITY (HCC): Primary | ICD-10-CM

## 2025-06-09 PROBLEM — Z15.89 HLA B27 (HLA B27 POSITIVE): Status: ACTIVE | Noted: 2025-06-09

## 2025-06-09 PROBLEM — M79.10 MYALGIA: Status: RESOLVED | Noted: 2020-01-16 | Resolved: 2025-06-09

## 2025-06-09 LAB
BILIRUBIN: NEGATIVE
GLUCOSE (URINE DIPSTICK): NEGATIVE MG/DL
KETONES (URINE DIPSTICK): NEGATIVE MG/DL
MULTISTIX LOT#: ABNORMAL NUMERIC
NITRITE, URINE: NEGATIVE
OCCULT BLOOD: NEGATIVE
PH, URINE: 6.5 (ref 4.5–8)
PROTEIN (URINE DIPSTICK): NEGATIVE MG/DL
SPECIFIC GRAVITY: 1.02 (ref 1–1.03)
UROBILINOGEN,SEMI-QN: 0.2 MG/DL (ref 0–1.9)

## 2025-06-09 PROCEDURE — 81002 URINALYSIS NONAUTO W/O SCOPE: CPT | Performed by: OBSTETRICS & GYNECOLOGY

## 2025-06-09 PROCEDURE — 76801 OB US < 14 WKS SINGLE FETUS: CPT | Performed by: OBSTETRICS & GYNECOLOGY

## 2025-06-10 LAB
C TRACH DNA SPEC QL NAA+PROBE: NEGATIVE
N GONORRHOEA DNA SPEC QL NAA+PROBE: NEGATIVE

## 2025-06-11 LAB — T VAGINALIS RRNA SPEC QL NAA+PROBE: NEGATIVE

## 2025-07-07 ENCOUNTER — TELEPHONE (OUTPATIENT)
Dept: OBGYN CLINIC | Facility: CLINIC | Age: 25
End: 2025-07-07

## 2025-07-07 ENCOUNTER — ROUTINE PRENATAL (OUTPATIENT)
Dept: OBGYN CLINIC | Facility: CLINIC | Age: 25
End: 2025-07-07

## 2025-07-07 DIAGNOSIS — Z82.79 FAMILY HISTORY OF SPINA BIFIDA: Primary | ICD-10-CM

## 2025-07-07 DIAGNOSIS — Z34.00 SUPERVISION OF NORMAL FIRST PREGNANCY, ANTEPARTUM (HCC): Primary | ICD-10-CM

## 2025-07-07 PROBLEM — M79.18 MYOFASCIAL PAIN: Status: RESOLVED | Noted: 2019-07-15 | Resolved: 2025-07-07

## 2025-07-07 PROBLEM — M25.552 BILATERAL HIP PAIN: Status: RESOLVED | Noted: 2023-02-27 | Resolved: 2025-07-07

## 2025-07-07 PROBLEM — M25.551 BILATERAL HIP PAIN: Status: RESOLVED | Noted: 2023-02-27 | Resolved: 2025-07-07

## 2025-07-07 PROBLEM — M25.512 PAIN IN LEFT ACROMIOCLAVICULAR JOINT: Status: RESOLVED | Noted: 2020-01-16 | Resolved: 2025-07-07

## 2025-07-07 PROBLEM — G89.29 CHRONIC RIGHT-SIDED LOW BACK PAIN WITHOUT SCIATICA: Status: RESOLVED | Noted: 2019-07-15 | Resolved: 2025-07-07

## 2025-07-07 PROBLEM — R29.898 SHOULDER WEAKNESS: Status: RESOLVED | Noted: 2021-05-17 | Resolved: 2025-07-07

## 2025-07-07 PROBLEM — M25.512 PAIN OF LEFT STERNOCLAVICULAR JOINT: Status: RESOLVED | Noted: 2020-01-16 | Resolved: 2025-07-07

## 2025-07-07 PROBLEM — M54.2 NECK PAIN: Status: RESOLVED | Noted: 2022-05-16 | Resolved: 2025-07-07

## 2025-07-07 PROBLEM — G25.89 SCAPULAR DYSKINESIS: Status: RESOLVED | Noted: 2019-07-15 | Resolved: 2025-07-07

## 2025-07-07 PROBLEM — M54.50 CHRONIC RIGHT-SIDED LOW BACK PAIN WITHOUT SCIATICA: Status: RESOLVED | Noted: 2019-07-15 | Resolved: 2025-07-07

## 2025-07-07 PROBLEM — M54.6 THORACIC SPINE PAIN: Status: RESOLVED | Noted: 2017-07-11 | Resolved: 2025-07-07

## 2025-08-07 ENCOUNTER — LAB ENCOUNTER (OUTPATIENT)
Dept: LAB | Age: 25
End: 2025-08-07
Attending: OBSTETRICS & GYNECOLOGY

## 2025-08-07 ENCOUNTER — ROUTINE PRENATAL (OUTPATIENT)
Dept: OBGYN CLINIC | Facility: CLINIC | Age: 25
End: 2025-08-07

## 2025-08-07 VITALS
SYSTOLIC BLOOD PRESSURE: 122 MMHG | HEART RATE: 79 BPM | BODY MASS INDEX: 26 KG/M2 | WEIGHT: 158.63 LBS | DIASTOLIC BLOOD PRESSURE: 73 MMHG

## 2025-08-07 DIAGNOSIS — Z34.92 ENCOUNTER FOR SUPERVISION OF NORMAL PREGNANCY IN SECOND TRIMESTER, UNSPECIFIED GRAVIDITY (HCC): ICD-10-CM

## 2025-08-07 DIAGNOSIS — Z34.92 ENCOUNTER FOR SUPERVISION OF NORMAL PREGNANCY IN SECOND TRIMESTER, UNSPECIFIED GRAVIDITY (HCC): Primary | ICD-10-CM

## 2025-08-07 PROBLEM — Z82.79 FAMILY HISTORY OF NEURAL TUBE DEFECT: Status: ACTIVE | Noted: 2025-08-07

## 2025-08-07 LAB
APPEARANCE: CLEAR
BILIRUBIN: NEGATIVE
GLUCOSE (URINE DIPSTICK): NEGATIVE MG/DL
KETONES (URINE DIPSTICK): NEGATIVE MG/DL
LEUKOCYTES: NEGATIVE
MULTISTIX LOT#: NORMAL NUMERIC
NITRITE, URINE: NEGATIVE
OCCULT BLOOD: NEGATIVE
PH, URINE: 6.5 (ref 4.5–8)
PROTEIN (URINE DIPSTICK): NEGATIVE MG/DL
SPECIFIC GRAVITY: 1.02 (ref 1–1.03)
URINE-COLOR: YELLOW
UROBILINOGEN,SEMI-QN: 0.2 MG/DL (ref 0–1.9)

## 2025-08-07 PROCEDURE — 36415 COLL VENOUS BLD VENIPUNCTURE: CPT

## 2025-08-07 PROCEDURE — 81002 URINALYSIS NONAUTO W/O SCOPE: CPT | Performed by: OBSTETRICS & GYNECOLOGY

## 2025-08-07 PROCEDURE — 81511 FTL CGEN ABNOR FOUR ANAL: CPT

## 2025-08-12 LAB
AFP MOM: 1.12
AFP VALUE: 43.3 NG/ML
DIA MOM: 0.64
DIA VALUE: 97.64 PG/ML
DSR 2ND TRI 1 IN: NORMAL
DSR BY AGE  1 IN: 936
GA ON COLL DATE: 17.4 WEEKS
HCG MOM: 1.28
HCG VALUE: NORMAL MIU/ML
INSULIN DEP AFP: NO
MAT AGE AT EDD: 26.8 YR
MULTIPLE GEST AFP: NO
OSBR RISK 1 IN AFP: 8371
UE3 MOM: 1.01
UE3 VALUE: 1.54 NG/ML
WEIGHT AFP: 158 LBS

## 2025-08-25 ENCOUNTER — HOSPITAL ENCOUNTER (OUTPATIENT)
Dept: PERINATAL CARE | Facility: HOSPITAL | Age: 25
Discharge: HOME | End: 2025-08-25
Attending: OBSTETRICS & GYNECOLOGY

## 2025-08-25 ENCOUNTER — HOSPITAL ENCOUNTER (OUTPATIENT)
Dept: PERINATAL CARE | Facility: HOSPITAL | Age: 25
End: 2025-08-25
Attending: OBSTETRICS & GYNECOLOGY

## 2025-08-25 VITALS
SYSTOLIC BLOOD PRESSURE: 122 MMHG | BODY MASS INDEX: 26 KG/M2 | HEART RATE: 102 BPM | WEIGHT: 159 LBS | DIASTOLIC BLOOD PRESSURE: 68 MMHG

## 2025-08-25 DIAGNOSIS — Z82.79 FAMILY HISTORY OF CONGENITAL ANOMALY: Primary | ICD-10-CM

## 2025-08-25 DIAGNOSIS — Z36.3 ENCOUNTER FOR ANTENATAL SCREENING FOR MALFORMATION USING ULTRASOUND (HCC): ICD-10-CM

## 2025-08-25 PROCEDURE — 76811 OB US DETAILED SNGL FETUS: CPT | Performed by: OBSTETRICS & GYNECOLOGY

## (undated) NOTE — LETTER
AUTHORIZATION FOR SURGICAL OPERATION OR OTHER PROCEDURE    1. I hereby authorize Dr. Brandt Morales and the [facility] staff assigned to my case to perform the following operation and/or procedure at the [facility]:    Trigger point injections       2.   My phy []  Parent    Responsible person                          []  Spouse  In case of minor or                    [] Other  _____________   Incompetent name:  __________________________________________________                               (please prin

## (undated) NOTE — ED AVS SNAPSHOT
Kristen Gutiérrez   MRN: [id number]    Department:  [facility] Emergency Department   Date of Visit:  6/16/2019           Disclosure     Insurance plans vary and the physician(s) referred by the ER may not be covered by your plan.  Please contact CARE PHYSICIAN AT ONCE OR RETURN IMMEDIATELY TO THE EMERGENCY DEPARTMENT. If you have been prescribed any medication(s), please fill your prescription right away and begin taking the medication(s) as directed.   If you believe that any of the medications

## (undated) NOTE — Clinical Note
I saw Karolina Giancarlo in the DEPARTMENT [facility] today for Acute non-recurrent ethmoidal sinusitis  (primary encounter diagnosis). she was treated with augmentin. Karolina Mondragon will follow up with you if no better or as needed.  Thank you for the opportunity

## (undated) NOTE — LETTER
[facility], [address], [place]  [address]             [address] [place] [address]         Patient Name:   Kenyatta Stevens ([id number])   Sex: female  : 3/26/2000       Order Date:  2023  Authorizing Provider:   Betsey Whelan     Procedure:  PHYSICAL THERAPY - INTERNAL [[id number]]         Order #:   [id number]  Qty:  1     Priority:  Routine                   Class:   IHP - RFL     Standing Interval:          Standing Occurrences:          Expires on:            Expected by:    Associated DX:  Cervical disc disease (M50.90)  Cervical radiculopathy (M54.12)  Shoulder weakness (R29.898)  Neck pain (M54.2)     Order summary:  IHP - RFL, Routine, 8 visits  Physical Therapy Area of Concentration: Ortho  Physical Therapy Ortho: Spine         Comments:  C5-6 mild diffuse, C6-7 small diffuse bulging discs  (primary encounter diagnosis)  L5-S1 mild central, L4-5 left mild, L3-4 left mild bulging discs  T12-L1 left mild foraminal herniated disc  T6-7 mild central/diffuse bulging disc  right C7 radiculopathy        2-3 times/week for 4-6 weeks  Cervical Jerrica protocol with neural mobilization. Advance to cervical stabilization. Scapular mobilization and stabilization. HEP           Scheduling Instructions:  Note to Managed Care Patients: This is the physician order form only and not an authorization for services. Your physician has recommended you to have physical therapy done at [facility] however, your insurance company may require you to have these services done at another facility or to obtain an approved referral. Services ordered by your physician may not be covered unless prior authorization is obtained in accordance with your insurance company's guidelines. Unauthorized care may be your financial responsibility. If you have questions, please call your plans customer service number located on your ID card.      To schedule Physical Therapy at any of the [facility] facilities, please call   [phone].      [employer] in [facility] OF THE [facility]  [phone] [facility] in [address] [facility], [address] [place] [address] Service in [address] [place] [address], [address] [place] [address] [address] [employer] in [facility]  [employer] in [address] [facility]             Ordering: María Elena Bah RN  Encounter Provider: Ricki Cuello MD  Order Authorizing Provider: Ricki Cuello MD [[id number]:[id number]]  Order Date: Nov 20, 2023 at 8:46 AM  Ordering Department: [facility]

## (undated) NOTE — ED AVS SNAPSHOT
[facility] Emergency Department    [address]     [place] [address]    Phone:  [phone]    Fax:  [phone]   MRN: [id number]    Department:  [facility] Emergency Department   Date of Visit:  4/2 and Class Registration line at [phone] or find a doctor online by visiting [email or web address].    IF THERE IS ANY CHANGE OR WORSENING OF YOUR CONDITION, CALL YOUR PRIMARY CARE PHYSICIAN AT ONCE OR RETURN IMMEDIATELY TO [address] [place] [address].     If

## (undated) NOTE — MR AVS SNAPSHOT
After Visit Summary   9/13/2017    Macy Low    MRN: [id number]           Visit Information     Date & Time  9/13/2017  7:15 AM Provider  Corrie Diaz, PT Department  [facility] Dept.  Phone  [phone]      Hilda Kuo SAME DAY  APPOINTMENTS  Available at primary care offices      [facility]     Treatment for mild  illness or injury that does  not require immediate attention.           Average cost

## (undated) NOTE — LETTER
11/8/2021      Lea Crhisty MD  Physical Medicine and Rehabilitation  [address] [address]  Dept: [phone]  Dept Fax: [phone]        RE: Consultation for Lis Monge        Dear No primary care provider on

## (undated) NOTE — LETTER
Patient: Yahaira Seals   YOB: 2000   Date of Visit: 1/14/2022     Dear  Dr. Serina Ontiveros MD,    Thank you for referring Yahaira Seals to my practice. Please find my assessment and plan below.           Chronic cholecystitis  (primary e

## (undated) NOTE — LETTER
6/19/2023      Shante Mckoy MD  Physical Medicine and Rehabilitation  [address] [address]  Dept: [phone]  Dept Fax: [phone]        RE: Consultation for Aayush Wells        Dear Cl Cortez MD,    Thank you very much for the opportunity to see your patient. Attached please find a summary from your patient's recent visit. I appreciate the chance to take care of your patient with you. Please feel free to call me with any questions or concerns. Sincerely,        Claralupe Diss.  Helen Mckoy MD  Electronically Signed on 6/19/2023

## (undated) NOTE — MR AVS SNAPSHOT
After Visit Summary   3/19/2020    Michael Landin    MRN: [id number]           Visit Information     Date & Time  3/19/2020 10:40 AM Provider  Marta Sargent MD [address] [place] [address], [address] [place] [address],[address], [facility]/[employer]  Phone  [phone] Avoid over sized portions. Don’t eat while when you’re bored.      EAT THESE FOODS MORE OFTEN: EAT THESE FOODS LESS OFTEN:   Make half your plate fruits and vegetables Highly refined, white starches including white bread, rice and pasta   Eat plenty of pr non-emergency, consider your options before heading to an ER. VIDEO VISITS  Visit face-to-face with a [facility] physician or   NATY using your mobile device or computer   using [employer].    e-VISITS  Communicate with a [facility] Physician or [facility] online.  The physician cyndi

## (undated) NOTE — ED AVS SNAPSHOT
[facility] Emergency Department    [address]     [place] [address]    Phone:  [phone]    Fax:  [phone] [facility]   MRN: [id number]    Department:  [facility] Emergency Department   Date of Visit:  4/2 Take 1 tablet (1 mg total) by mouth 2 (two) times daily.             Where to Get Your Medications      You can get these medications from any pharmacy     Bring a paper prescription for each of these medications    - Benztropine Mesylate 1 MG Tabs discretion of that Physician.   If you need additional assistance selecting a physician, you may call the [employer] [facility] Physician Referral and Class Registration line at [phone] or find a doctor online by visiting [email or web address].    IF THE Additional Information       We are concerned for your overall well being:    - If you are a smoker or have smoked in the last 12 months, we encourage you to explore options for quitting.     - If you have concerns related to behavioral health issues or th

## (undated) NOTE — LETTER
[facility] ULTRASOUND  [address] [facility] [address]  [phone]  [phone]  Authorization for Imaging Procedure  Date of Procedure:     I hereby authorize  __________________, my physician and his/her assistants (if applicable), which may include medical students, residents, and/or fellows, to perform the following procedure and administer such anesthesia as may be determined necessary by my physician: ULTRASOUND GUIDED BILATERAL NECK LYMPH NODE FINE NEEDLE ASPIRATION BIOPSIES   on Huma Shields.   2.  I recognize that during the procedure, unforeseen conditions may necessitate additional or different procedures than those listed above. I, therefore, further authorize and request that the above-named physician, assistants, or designees perform such procedures as are, in their judgment, necessary and desirable.    3.  My physician has discussed prior to my procedure the potential benefits, risks and side effects of this procedure; the likelihood of achieving goals; and potential problems that might occur during recuperation. They also discussed reasonable alternatives to the procedure, including risks, benefits, and side effects related to the alternatives and risks related to not receiving this procedure. I have had all my questions answered and I acknowledge that no guarantee has been made as to the result that may be obtained.    4.  Should the need arise during my procedure, which includes change of level of care prior to discharge, I also consent to the administration of blood and/or blood products. Further, I understand that despite careful testing and screening of blood or blood products by collecting agencies, I may still be subject to ill effects as a result of receiving a blood transfusion and/or blood products. The following are some, but not all, of the potential risks that can occur: fever and allergic reactions, hemolytic reactions, transmission of diseases such as  Hepatitis, AIDS and Cytomegalovirus (CMV) and fluid overload. In the event that I wish to have an autologous transfusion of my own blood, or a directed donor transfusion, I will discuss this with my physician.  Check only if Refusing Blood or Blood Products  I understand refusal of blood or blood products as deemed necessary by my physician may have serious consequences to my condition to include possible death. I hereby assume responsibility for my refusal and release the hospital, its personnel, and my physicians from any responsibility for the consequences of my refusal.   [  ] Patient Refuses Blood      5.  I authorize the use of any specimen, organs, tissues, body parts or foreign objects that may be removed from my body during the procedure for diagnosis, research or teaching purposes and their subsequent disposal by hospital authorities. I also authorize the release of specimen test results and/or written reports to my treating physician on the hospital medical staff or other referring or consulting physicians involved in my care, at the discretion of the Pathologist or my treating physician.    6.  I consent to the photographing or videotaping of the procedures to be performed, including appropriate portions of my body for medical, scientific, or educational purposes, provided my identity is not revealed by the pictures or by descriptive texts accompanying them. If the procedure has been photographed/videotaped, the physician will obtain the original picture, image, videotape or CD. The hospital will not be responsible for storage, release or maintenance of the picture, image, tape or CD.   7.  I consent to the presence of a  or observers in the operating room as deemed necessary by my physician or their designees.    8.  I recognize that in the event my procedure results in extended X-Ray/fluoroscopy time, I may develop a skin reaction.    9.  If I have a Do Not Attempt Resuscitation  (DNAR) order in place, that status will be suspended while in the operating room, procedural suite, and during the recovery period unless otherwise explicitly stated by me (or a person authorized to consent on my behalf). The performing physician or my attending physician will determine when the applicable recovery period ends for purposes of reinstating the DNAR order.  10.  I acknowledge that my physician has explained sedation/analgesia administration to me including the risk and benefits I consent to the administration of sedation/analgesia as may be necessary or desirable in the judgment of my physician.      I CERTIFY THAT I HAVE READ AND FULLY UNDERSTAND THE ABOVE CONSENT FOR THE PROCEDURE.   Signature of Patient: _____________________________________________________________  Responsible person in case of minor, unconscious: ____________________________________  Relationship to patient:  __________________________________________________________  Signature of Witness: _______________________________Date: _________Time: __________    Statement of Physician: My signature below affirms that prior to the time of the procedure, I have explained to the patient and/or her guardian, the risks and benefits involved in the proposed treatment and any reasonable alternative to the proposed treatment. I have also explained the risks and benefits involved in the refusal of the proposed treatment and have answered the patient's questions. If I have a significant financial interest in a co-management agreement or a significant financial interest in any product or implant, or other significant relationship used in the procedure/surgery, I have disclosed this and had a discussion with my patient.  Signature of Physician:   _________________________________Date:_____________Time:________    Patient Name: Huma Samantha Shields : 3/26/2000  Printed: 2025   Medical Record #: [id number]

## (undated) NOTE — LETTER
AUTHORIZATION FOR SURGICAL OPERATION OR OTHER PROCEDURE    1.  I hereby authorize Dr.Alex Denisha Adan and the [facility] staff assigned to my case to perform the following operation and/or procedure at the [facility]:     Trigger point injections      ___________ Patient signature:  ___________________________________________________             Relationship to Patient:           []  Parent    Responsible person                          []  Spouse  In case of minor or                    [] Other  _____________   In

## (undated) NOTE — LETTER
6/17/2024      Brian Christy MD  Physical Medicine and Rehabilitation  [address] [place] [address], [address]  [facility] [address]  Dept: [phone]  Dept Fax: [phone]        RE: Consultation for Huma Shields        Dear Rissa Robles MD,    Thank you very much for the opportunity to see your patient.  Attached please find a summary from your patient's recent visit.     I appreciate the chance to take care of your patient with you.  Please feel free to call me with any questions or concerns.    Sincerely,        Brian Christy MD  Electronically Signed on 6/17/2024

## (undated) NOTE — MR AVS SNAPSHOT
[facility] SPECIALTY [facility] [address] [address] [place]  [id number] [phone]               Thank you for choosing us for your health care visit with Silas Jimenez MD.  We are glad to serve you and happy to provide you with this summary of Assoc Dx:  Routine general medical examination at a health care facility [Z00.00]           Vit D total    Complete by:  Jun 20, 2017 (Approximate)    Assoc Dx:  Routine general medical examination at a health care facility [Z00.00]           Vitamin B12 [address] [address]   Phone:  [phone]   Fax:  [phone]         Referral Orders      Normal Orders This Visit    PHYSIATRY - INTERNAL [[id number] CUSTOM]  Order #:  [id number]         **REFERRAL REQUEST**    Your physician has referred you to a speciali You have not been prescribed any medications.             Health Goals discussed Today        Last Edited    Dec 06, 2016    Therapy Goals 12/6/2016 11:22 AM by Isai Wilder PTA  Yes    Goal Comments    Long Term Goal #1    Patient will be independent wi Don’t forget strength training with weights and resistance Set goals and track your progress   You don’t need to join a gym. Home exercises work great.  Put more priority on exercise in your life                    Visit [facility] online at